# Patient Record
Sex: FEMALE | Race: BLACK OR AFRICAN AMERICAN | Employment: OTHER | URBAN - METROPOLITAN AREA
[De-identification: names, ages, dates, MRNs, and addresses within clinical notes are randomized per-mention and may not be internally consistent; named-entity substitution may affect disease eponyms.]

---

## 2019-06-26 ENCOUNTER — HOSPITAL ENCOUNTER (OUTPATIENT)
Age: 49
Setting detail: SPECIMEN
Discharge: HOME | End: 2019-06-26
Payer: COMMERCIAL

## 2019-06-26 DIAGNOSIS — Z13.220 NEED FOR LIPID SCREENING: ICD-10-CM

## 2019-06-26 DIAGNOSIS — D50.8 OTHER IRON DEFICIENCY ANEMIA: ICD-10-CM

## 2019-06-26 DIAGNOSIS — Z13.1 SCREENING FOR DIABETES MELLITUS: ICD-10-CM

## 2019-06-26 DIAGNOSIS — Z11.4 ENCOUNTER FOR SCREENING FOR HIV: ICD-10-CM

## 2019-06-26 LAB
ABSOLUTE EOS #: 0.26 K/UL (ref 0–0.44)
ABSOLUTE IMMATURE GRANULOCYTE: 0.03 K/UL (ref 0–0.3)
ABSOLUTE LYMPH #: 2.91 K/UL (ref 1.1–3.7)
ABSOLUTE MONO #: 0.48 K/UL (ref 0.1–1.2)
ALBUMIN SERPL-MCNC: 4.1 G/DL (ref 3.5–5.2)
ALBUMIN/GLOBULIN RATIO: 1.2 (ref 1–2.5)
ALP BLD-CCNC: 66 U/L (ref 35–104)
ALT SERPL-CCNC: 13 U/L (ref 5–33)
ANION GAP SERPL CALCULATED.3IONS-SCNC: 10 MMOL/L (ref 9–17)
AST SERPL-CCNC: 14 U/L
BASOPHILS # BLD: 1 % (ref 0–2)
BASOPHILS ABSOLUTE: 0.07 K/UL (ref 0–0.2)
BILIRUB SERPL-MCNC: 0.25 MG/DL (ref 0.3–1.2)
BUN BLDV-MCNC: 14 MG/DL (ref 6–20)
BUN/CREAT BLD: ABNORMAL (ref 9–20)
CALCIUM SERPL-MCNC: 9 MG/DL (ref 8.6–10.4)
CHLORIDE BLD-SCNC: 106 MMOL/L (ref 98–107)
CHOLESTEROL, FASTING: 241 MG/DL
CHOLESTEROL/HDL RATIO: 4.5
CO2: 23 MMOL/L (ref 20–31)
CREAT SERPL-MCNC: 0.89 MG/DL (ref 0.5–0.9)
DIFFERENTIAL TYPE: ABNORMAL
EOSINOPHILS RELATIVE PERCENT: 3 % (ref 1–4)
GFR AFRICAN AMERICAN: >60 ML/MIN
GFR NON-AFRICAN AMERICAN: >60 ML/MIN
GFR SERPL CREATININE-BSD FRML MDRD: ABNORMAL ML/MIN/{1.73_M2}
GFR SERPL CREATININE-BSD FRML MDRD: ABNORMAL ML/MIN/{1.73_M2}
GLUCOSE FASTING: 83 MG/DL (ref 70–99)
HCT VFR BLD CALC: 40.9 % (ref 36.3–47.1)
HDLC SERPL-MCNC: 53 MG/DL
HEMOGLOBIN: 12.1 G/DL (ref 11.9–15.1)
HIV AG/AB: NONREACTIVE
IMMATURE GRANULOCYTES: 0 %
LDL CHOLESTEROL: 177 MG/DL (ref 0–130)
LYMPHOCYTES # BLD: 33 % (ref 24–43)
MCH RBC QN AUTO: 21.3 PG (ref 25.2–33.5)
MCHC RBC AUTO-ENTMCNC: 29.6 G/DL (ref 28.4–34.8)
MCV RBC AUTO: 72 FL (ref 82.6–102.9)
MONOCYTES # BLD: 5 % (ref 3–12)
NRBC AUTOMATED: 0 PER 100 WBC
PDW BLD-RTO: 19.9 % (ref 11.8–14.4)
PLATELET # BLD: 450 K/UL (ref 138–453)
PLATELET ESTIMATE: ABNORMAL
PMV BLD AUTO: 10.2 FL (ref 8.1–13.5)
POTASSIUM SERPL-SCNC: 4.7 MMOL/L (ref 3.7–5.3)
RBC # BLD: 5.68 M/UL (ref 3.95–5.11)
RBC # BLD: ABNORMAL 10*6/UL
SEG NEUTROPHILS: 58 % (ref 36–65)
SEGMENTED NEUTROPHILS ABSOLUTE COUNT: 5.21 K/UL (ref 1.5–8.1)
SODIUM BLD-SCNC: 139 MMOL/L (ref 135–144)
TOTAL PROTEIN: 7.6 G/DL (ref 6.4–8.3)
TRIGLYCERIDE, FASTING: 56 MG/DL
VLDLC SERPL CALC-MCNC: ABNORMAL MG/DL (ref 1–30)
WBC # BLD: 9 K/UL (ref 3.5–11.3)
WBC # BLD: ABNORMAL 10*3/UL

## 2019-07-10 ENCOUNTER — HOSPITAL ENCOUNTER (OUTPATIENT)
Dept: MAMMOGRAPHY | Facility: CLINIC | Age: 49
Discharge: HOME | End: 2019-07-12
Payer: COMMERCIAL

## 2019-07-10 ENCOUNTER — HOSPITAL ENCOUNTER (OUTPATIENT)
Age: 49
Setting detail: SPECIMEN
Discharge: HOME | End: 2019-07-10
Payer: COMMERCIAL

## 2019-07-10 DIAGNOSIS — R53.83 FATIGUE, UNSPECIFIED TYPE: ICD-10-CM

## 2019-07-10 DIAGNOSIS — Z12.31 SCREENING MAMMOGRAM, ENCOUNTER FOR: ICD-10-CM

## 2019-07-10 DIAGNOSIS — R71.8 DECREASED MEAN CORPUSCULAR VOLUME: ICD-10-CM

## 2019-07-10 LAB
FOLATE: 10.9 NG/ML
IRON SATURATION: 10 % (ref 20–55)
IRON: 30 UG/DL (ref 37–145)
TOTAL IRON BINDING CAPACITY: 299 UG/DL (ref 250–450)
UNSATURATED IRON BINDING CAPACITY: 269 UG/DL (ref 112–347)
VITAMIN B-12: 1507 PG/ML (ref 232–1245)

## 2019-07-10 PROCEDURE — 77067 SCR MAMMO BI INCL CAD: CPT

## 2019-07-12 LAB
HPV SAMPLE: NORMAL
HPV, GENOTYPE 16: NOT DETECTED
HPV, GENOTYPE 18: NOT DETECTED
HPV, HIGH RISK OTHER: NOT DETECTED
HPV, INTERPRETATION: NORMAL
SPECIMEN DESCRIPTION: NORMAL

## 2019-07-15 ENCOUNTER — HOSPITAL ENCOUNTER (OUTPATIENT)
Dept: ULTRASOUND IMAGING | Age: 49
End: 2019-07-15
Payer: COMMERCIAL

## 2019-07-15 ENCOUNTER — HOSPITAL ENCOUNTER (OUTPATIENT)
Dept: MAMMOGRAPHY | Age: 49
Discharge: HOME | End: 2019-07-17
Payer: COMMERCIAL

## 2019-07-15 DIAGNOSIS — R92.8 ABNORMAL MAMMOGRAM OF BOTH BREASTS: ICD-10-CM

## 2019-07-15 LAB — CYTOLOGY REPORT: NORMAL

## 2019-07-15 PROCEDURE — G0279 TOMOSYNTHESIS, MAMMO: HCPCS

## 2019-10-28 ENCOUNTER — HOSPITAL ENCOUNTER (OUTPATIENT)
Dept: GENERAL RADIOLOGY | Facility: CLINIC | Age: 49
Discharge: HOME | End: 2019-10-30
Payer: COMMERCIAL

## 2019-10-28 ENCOUNTER — HOSPITAL ENCOUNTER (OUTPATIENT)
Age: 49
Setting detail: SPECIMEN
Discharge: HOME | End: 2019-10-28
Payer: COMMERCIAL

## 2019-10-28 DIAGNOSIS — M79.10 MYALGIA: ICD-10-CM

## 2019-10-28 DIAGNOSIS — R06.02 SOB (SHORTNESS OF BREATH): ICD-10-CM

## 2019-10-28 DIAGNOSIS — R63.4 WEIGHT LOSS: ICD-10-CM

## 2019-10-28 LAB
ALBUMIN SERPL-MCNC: 3.7 G/DL (ref 3.5–5.2)
ALBUMIN/GLOBULIN RATIO: 1.1 (ref 1–2.5)
ALP BLD-CCNC: 64 U/L (ref 35–104)
ALT SERPL-CCNC: 13 U/L (ref 5–33)
ANION GAP SERPL CALCULATED.3IONS-SCNC: 13 MMOL/L (ref 9–17)
AST SERPL-CCNC: 17 U/L
BILIRUB SERPL-MCNC: 0.26 MG/DL (ref 0.3–1.2)
BUN BLDV-MCNC: 11 MG/DL (ref 6–20)
BUN/CREAT BLD: ABNORMAL (ref 9–20)
CALCIUM SERPL-MCNC: 9.1 MG/DL (ref 8.6–10.4)
CHLORIDE BLD-SCNC: 108 MMOL/L (ref 98–107)
CO2: 26 MMOL/L (ref 20–31)
CREAT SERPL-MCNC: 0.61 MG/DL (ref 0.5–0.9)
GFR AFRICAN AMERICAN: >60 ML/MIN
GFR NON-AFRICAN AMERICAN: >60 ML/MIN
GFR SERPL CREATININE-BSD FRML MDRD: ABNORMAL ML/MIN/{1.73_M2}
GFR SERPL CREATININE-BSD FRML MDRD: ABNORMAL ML/MIN/{1.73_M2}
GLUCOSE FASTING: 93 MG/DL (ref 70–99)
HCT VFR BLD CALC: 39.9 % (ref 36.3–47.1)
HEMOGLOBIN: 11.9 G/DL (ref 11.9–15.1)
IRON SATURATION: 10 % (ref 20–55)
IRON: 28 UG/DL (ref 37–145)
MCH RBC QN AUTO: 21.6 PG (ref 25.2–33.5)
MCHC RBC AUTO-ENTMCNC: 29.8 G/DL (ref 28.4–34.8)
MCV RBC AUTO: 72.4 FL (ref 82.6–102.9)
NRBC AUTOMATED: 0 PER 100 WBC
PDW BLD-RTO: 19.4 % (ref 11.8–14.4)
PLATELET # BLD: 410 K/UL (ref 138–453)
PMV BLD AUTO: 9.8 FL (ref 8.1–13.5)
POTASSIUM SERPL-SCNC: 4 MMOL/L (ref 3.7–5.3)
RBC # BLD: 5.51 M/UL (ref 3.95–5.11)
SODIUM BLD-SCNC: 147 MMOL/L (ref 135–144)
TOTAL IRON BINDING CAPACITY: 267 UG/DL (ref 250–450)
TOTAL PROTEIN: 7.1 G/DL (ref 6.4–8.3)
TSH SERPL DL<=0.05 MIU/L-ACNC: 3.9 MIU/L (ref 0.3–5)
UNSATURATED IRON BINDING CAPACITY: 239 UG/DL (ref 112–347)
VITAMIN D 25-HYDROXY: 22 NG/ML (ref 30–100)
WBC # BLD: 6.4 K/UL (ref 3.5–11.3)

## 2019-10-28 PROCEDURE — 71046 X-RAY EXAM CHEST 2 VIEWS: CPT

## 2020-01-01 ENCOUNTER — HOSPITAL ENCOUNTER (OUTPATIENT)
Age: 50
Setting detail: SPECIMEN
Discharge: HOME | End: 2020-10-26
Payer: COMMERCIAL

## 2020-01-01 LAB
ABSOLUTE EOS #: 0.17 K/UL (ref 0–0.44)
ABSOLUTE IMMATURE GRANULOCYTE: <0.03 K/UL (ref 0–0.3)
ABSOLUTE LYMPH #: 2.55 K/UL (ref 1.1–3.7)
ABSOLUTE MONO #: 0.4 K/UL (ref 0.1–1.2)
ALBUMIN SERPL-MCNC: 4 G/DL (ref 3.5–5.2)
ALBUMIN/GLOBULIN RATIO: 1.3 (ref 1–2.5)
ALP BLD-CCNC: 56 U/L (ref 35–104)
ALT SERPL-CCNC: 8 U/L (ref 5–33)
ANION GAP SERPL CALCULATED.3IONS-SCNC: 13 MMOL/L (ref 9–17)
AST SERPL-CCNC: 14 U/L
BASOPHILS # BLD: 1 % (ref 0–2)
BASOPHILS ABSOLUTE: 0.06 K/UL (ref 0–0.2)
BILIRUB SERPL-MCNC: 0.23 MG/DL (ref 0.3–1.2)
BUN BLDV-MCNC: 12 MG/DL (ref 6–20)
BUN/CREAT BLD: ABNORMAL (ref 9–20)
CALCIUM SERPL-MCNC: 9.4 MG/DL (ref 8.6–10.4)
CHLORIDE BLD-SCNC: 105 MMOL/L (ref 98–107)
CHOLESTEROL, FASTING: 236 MG/DL
CHOLESTEROL/HDL RATIO: 5.2
CO2: 26 MMOL/L (ref 20–31)
CREAT SERPL-MCNC: 0.55 MG/DL (ref 0.5–0.9)
DIFFERENTIAL TYPE: ABNORMAL
EOSINOPHILS RELATIVE PERCENT: 3 % (ref 1–4)
FERRITIN: 39 UG/L (ref 13–150)
GFR AFRICAN AMERICAN: >60 ML/MIN
GFR NON-AFRICAN AMERICAN: >60 ML/MIN
GFR SERPL CREATININE-BSD FRML MDRD: ABNORMAL ML/MIN/{1.73_M2}
GFR SERPL CREATININE-BSD FRML MDRD: ABNORMAL ML/MIN/{1.73_M2}
GLUCOSE BLD-MCNC: 98 MG/DL (ref 70–99)
HCT VFR BLD CALC: 41.5 % (ref 36.3–47.1)
HDLC SERPL-MCNC: 45 MG/DL
HEMOGLOBIN: 12.3 G/DL (ref 11.9–15.1)
IMMATURE GRANULOCYTES: 0 %
IRON SATURATION: 13 % (ref 20–55)
IRON: 36 UG/DL (ref 37–145)
LDL CHOLESTEROL: 177 MG/DL (ref 0–130)
LYMPHOCYTES # BLD: 46 % (ref 24–43)
MCH RBC QN AUTO: 22.9 PG (ref 25.2–33.5)
MCHC RBC AUTO-ENTMCNC: 29.6 G/DL (ref 28.4–34.8)
MCV RBC AUTO: 77.4 FL (ref 82.6–102.9)
MONOCYTES # BLD: 7 % (ref 3–12)
NRBC AUTOMATED: 0 PER 100 WBC
PDW BLD-RTO: 17.6 % (ref 11.8–14.4)
PLATELET # BLD: 311 K/UL (ref 138–453)
PLATELET ESTIMATE: ABNORMAL
PMV BLD AUTO: 10.9 FL (ref 8.1–13.5)
POTASSIUM SERPL-SCNC: 4.1 MMOL/L (ref 3.7–5.3)
RBC # BLD: 5.36 M/UL (ref 3.95–5.11)
RBC # BLD: ABNORMAL 10*6/UL
SEG NEUTROPHILS: 43 % (ref 36–65)
SEGMENTED NEUTROPHILS ABSOLUTE COUNT: 2.4 K/UL (ref 1.5–8.1)
SODIUM BLD-SCNC: 144 MMOL/L (ref 135–144)
TOTAL IRON BINDING CAPACITY: 280 UG/DL (ref 250–450)
TOTAL PROTEIN: 7 G/DL (ref 6.4–8.3)
TRIGLYCERIDE, FASTING: 71 MG/DL
UNSATURATED IRON BINDING CAPACITY: 244 UG/DL (ref 112–347)
VITAMIN D 25-HYDROXY: 22.7 NG/ML (ref 30–100)
VLDLC SERPL CALC-MCNC: ABNORMAL MG/DL (ref 1–30)
WBC # BLD: 5.6 K/UL (ref 3.5–11.3)
WBC # BLD: ABNORMAL 10*3/UL

## 2020-01-03 ENCOUNTER — HOSPITAL ENCOUNTER (OUTPATIENT)
Dept: GENERAL RADIOLOGY | Facility: CLINIC | Age: 50
Discharge: HOME | End: 2020-01-05
Payer: COMMERCIAL

## 2020-01-03 ENCOUNTER — HOSPITAL ENCOUNTER (OUTPATIENT)
Age: 50
Setting detail: SPECIMEN
Discharge: HOME | End: 2020-01-03
Payer: COMMERCIAL

## 2020-01-03 LAB
IRON SATURATION: 11 % (ref 20–55)
IRON: 32 UG/DL (ref 37–145)
TOTAL IRON BINDING CAPACITY: 291 UG/DL (ref 250–450)
UNSATURATED IRON BINDING CAPACITY: 259 UG/DL (ref 112–347)
VITAMIN D 25-HYDROXY: 23.7 NG/ML (ref 30–100)

## 2020-01-03 PROCEDURE — 71046 X-RAY EXAM CHEST 2 VIEWS: CPT

## 2020-06-29 ENCOUNTER — HOSPITAL ENCOUNTER (OUTPATIENT)
Dept: OCCUPATIONAL THERAPY | Age: 50
Setting detail: THERAPIES SERIES
Discharge: HOME | End: 2020-06-29
Payer: COMMERCIAL

## 2020-06-29 PROCEDURE — 97110 THERAPEUTIC EXERCISES: CPT

## 2020-06-29 PROCEDURE — 97165 OT EVAL LOW COMPLEX 30 MIN: CPT

## 2020-06-29 NOTE — CONSULTS
currently assists the patient with task   Bathing  [x] Independent  [] Assist [x] Independent  [] Assist    Dress/grooming [x] Independent  [] Assist [x] Independent  [] Assist    Transfer/mobility [x] Independent  [] Assist [x] Independent  [] Assist    Feeding [x] Independent  [] Assist [x] Independent  [] Assist    Toileting [x] Independent  [] Assist [x] Independent  [] Assist    Driving [x] Independent  [] Assist [x] Independent  [] Assist    Housekeeping [x] Independent  [] Assist [x] Independent  [] Assist    Grocery shop/meal prep [x] Independent  [] Assist [x] Independent  [] Assist      Gait Prior level of function Current level of function    [x] Independent  [] Assist [x] Independent  [] Assist   Device: [x] Independent [x] Independent    [] Straight Cane [] Quad cane [] Straight Cane [] Quad cane    [] Standard walker [] Rolling walker   [] 4 wheeled walker [] Standard walker [] Rolling walker   [] 4 wheeled walker    [] Wheelchair [] Wheelchair       Work Status: Normal  Orthosis:    Currently has bilateral wrist cock up splints, only wears L while at work. Pt reports wearing approximately 8 hours/day. Subjective:   Chief Complaint: tingling all the time   Pain: Intensity:   3/10 Location: L wrist     Pain Type: constant and with sedentary activity    Pain Altered Tx: no  Action Taken:none      Objective:  Tests/Measurements: Upper Extremity Functional Index  Current Functional Level:  44 functionally impaired as measured with the Upper Extremity Functional Index Survey. 0-80 scale, with 80 = no Deficits  (The UEFI model does not provide any specific cut off points that could classify the upper limb disability degree, however, a minimal detectable change of 9 points is provided.   This means that for improvement or deterioration to be considered, between two subsequent evaluations, the scores must differ by at least 9 points.)    Sensibility: Tingling and Constant  Edema: None    However, pt reports L digits will become swollen after working    Color: Normal    Skin: Intact    STRENGTH      RIGHT LEFT    35 14   Lateral pinch 3 2   2 point pinch 3 3   3 jaw pinch 3 3     The affected extremity is 60% weaker than the unaffected extremity. (affected score/unaffected score, take the total and subtract from 100)      COORDINATION  - Fine Motor      Right in seconds Percentile Left in seconds Percentile   9 Hole Peg Test 26.32  42.06        DIGITS - L hand         Extension/Flexion   INDEX MIDDLE   MCP 0/WNL 0/WNL   PIP -50/WNL -40/WNL   DIP 0/WNL 0/WNL          DIGITS - R hand         Extension/Flexion   MIDDLE   MCP WNL   PIP -20/WNL   DIP WNL                 Problems: Pain, ROM, Strength, Function, Coordination and Sensation     Assessment:  Patient would benefit from skilled occupational therapy services in order to: increase strength, ROM, coordination, function, and decrease tingling sensations in fingertips to allow for participation in work and self-care activities at prior level of function. Short Term Goals: (  5    Treatments)  1. Decrease Pain: to 2/10 with work activities   2. Increase AROM (degrees)  a. PIP extension to -10 for increased functional use of all digits   3. Increase strength   a. L  strength to 20 pounds for improved ADL participation   b. Bilateral lateral pinch to 7 pounds for improved ability to hold onto small objects  c. Bilateral 2 point pinch to 7 pounds for improved ability to tie shoes    d. Bilateral 3 point pinch to 7 pounds for improved ability to complete buttons on clothing   4. Increase function:UE Functional Index Score to 60 to promote increased function  5. Patient to be independent with home exercise program as demonstrated by performance with correct form without cues. Long Term Goals: (  9  Treatments)  1. Increase L  strength to 25 pounds or more for safe participation in work tasks   2.  Tolerate iontophoresis treatment, given doctor approval  3. Improve active PIP extension of all digits to 0 degrees for increased functional use of bilateral hands  4. Decrease 9 hole peg test time to 30 seconds or less for improve fine motor skills     Patient Goals: button pants and tie shoes     Treatment Potential: Good Suggested Professional Referral: No  Domestic Concerns: No   Barriers to Goal Achievement: No    Comments/Assessment: Pt presents this date s/p diagnosis of bilateral wrist sprain and bilateral carpal tunnel syndrome. Pt is a  for [email or web address] and is required to type for multiple hours a day. Pt reports experiencing pain and tingling in bilateral wrists and hands for extended period of time, but has recently gotten worse. Pt reports L hand and wrist are more severe than R side. Pt is R hand dominant. Pt reports difficulty with buttons, tying shoes, and tying any objects. Pt reports 0/10 pain in R wrist at current time. Pt reports pain will be approximately 6/10 in L wrist by end of work week. Pt reports pain as dull \"most of the time\" but can become sharp and severe at times. Pt reports tingling in fingertips of all digits of L hand and thumb. Pt demo impaired active extension of PIP joints of index and middle fingers on L hand and middle finger on R hand. Pt reports impaired ROM is new since symptoms have worsened. Pt demo impaired strength, function, coordination, ROM, and function of bilateral wrists and hands. Pt with difficulty holding onto pinch meter due to weakness and impaired ROM of index and middle fingers of L hand. Pt issued median nerve glides and AROM exercises this date to complete for HEP, good understanding demonstrated. Pt would benefit from Iontophoresis treatment for decrease of local tissue inflammation and damage.   Waveform: DC, Dosage: 40-80 mA min, Intensity: Up to 4-5 mA, Duration: 10-20 mins depending on dosage and intensity  Allergies discussed with pt and pt identifier confirmed by statement of full

## 2020-07-01 ENCOUNTER — HOSPITAL ENCOUNTER (OUTPATIENT)
Dept: OCCUPATIONAL THERAPY | Age: 50
Setting detail: THERAPIES SERIES
Discharge: HOME | End: 2020-07-01
Payer: COMMERCIAL

## 2020-07-01 PROCEDURE — 97530 THERAPEUTIC ACTIVITIES: CPT

## 2020-07-01 PROCEDURE — 97035 APP MDLTY 1+ULTRASOUND EA 15: CPT

## 2020-07-01 NOTE — FLOWSHEET NOTE
extension  Palmar pinch  Lateral pinch     yellow 10 Issued this date   Plan to complete 3 jaw golden and finger extension on next visit. Other: muscle loss noted in B thenar eminence with tight flexor tendons to the L index and long digit. Not noted nodules at this time. Initiated theraputty this date with good response, plan to add in additional putty exercises on next visit. Specific Instructions for next treatment:       Treatment Charges: Mins Units Time In/Out   [x]  Modalities US  8  1 0300-8922   [x]  Ther Exercise 24 1 6805-9312   []  Manual Therapy      []  Ther Activities      []        []        []        Total Treatment time 32 min           Assessment: Progressing Towards Goals    Short Term Goals: (  5    Treatments)  1. Decrease Pain: to 2/10 with work activities   2. Increase AROM (degrees)  a. PIP extension to -10 for increased functional use of all digits   3. Increase strength   a. L  strength to 20 pounds for improved ADL participation   b. Bilateral lateral pinch to 7 pounds for improved ability to hold onto small objects  c. Bilateral 2 point pinch to 7 pounds for improved ability to tie shoes    d. Bilateral 3 point pinch to 7 pounds for improved ability to complete buttons on clothing   4. Increase function:UE Functional Index Score to 60 to promote increased function  5. Patient to be independent with home exercise program as demonstrated by performance with correct form without cues.     Long Term Goals: (  9  Treatments)  1. Increase L  strength to 25 pounds or more for safe participation in work tasks   2. Tolerate iontophoresis treatment, given doctor approval  3. Improve active PIP extension of all digits to 0 degrees for increased functional use of bilateral hands  4. Decrease 9 hole peg test time to 30 seconds or less for improve fine motor skills          Pt.  Education:  Reviewed Prior HEP/Ed  Method of Education: Verbal, Written and Demo  Comprehension of Education: Needs Review      Plan:  Continue with current plan           Time In/Out: 0120-5737       Electronically signed by:  Noemi Posey OTR/L

## 2020-07-02 ENCOUNTER — HOSPITAL ENCOUNTER (OUTPATIENT)
Dept: OCCUPATIONAL THERAPY | Age: 50
Setting detail: THERAPIES SERIES
Discharge: HOME | End: 2020-07-02
Payer: COMMERCIAL

## 2020-07-02 PROCEDURE — 97035 APP MDLTY 1+ULTRASOUND EA 15: CPT

## 2020-07-02 PROCEDURE — 97140 MANUAL THERAPY 1/> REGIONS: CPT

## 2020-07-02 PROCEDURE — 97110 THERAPEUTIC EXERCISES: CPT

## 2020-07-02 NOTE — FLOWSHEET NOTE
completed    Ultrasound - see parameters above     completed   Soft tissue massage      completed     Theraputty   strength  Finger flexion  Finger extension  Palmar pinch  Lateral pinch     10 reps Yellow Completed  Home ex program reviewed. Three jaw golden ex not added to program, as pt unable to position hands for finger extension ex. Other: Signed order received for iontophoresis from Dr. Joel Matias, awaiting -9 authorization. Original C-9 auth for treatment for bilateral wrist sprains only. Significant muscle atrophy observed in left hand thenar eminence and lumbrical/interossious muscles, with mild atrophy and decreased muscle tone in hypothenar eminence musculature. Left index and middle fingers posture in \"claw\" position (full passive extension present). Finger extension of these fingers is  innervated by the C7 root. Left thumb also in claw position with thumb postured in same plane as hand. Functional /grasp of the left hand, specifically lack of active thumb palmar abduction and full finger extension, is compromised by the muscle imbalance noted above. The right hand, by pt report is now starting to exhibit similar symptoms. Mild right thenar muscle atrophy and clawing of index and middle fingers observed. Hypothenar muscles have decreased muscle tone as evidenced by palpation. Pt reports pain (3/10) with active right wrist flexion. Putty exercises initiated, with additional putty ex planned for subsequent visits. Specific Instructions for next treatment:    Treatment Charges: Mins Units Time In/Out   [x]  Modalities: Ultrasound   8 8 1236 - 5725   [x]  Ther Exercise 34 2 6546 - 7721    [x]  Manual Therapy 18 1 2447 - 0542   []  Ther Activities      []        []        []        Total Treatment time 60 min           Assessment: Progressing Towards Goals    Short Term Goals: (  5    Treatments)  1. Decrease Pain: to 2/10 with work activities   2. Increase AROM (degrees)  a.  PIP extension to -10 for increased functional use of all digits   3. Increase strength   a. L  strength to 20 pounds for improved ADL participation   b. Bilateral lateral pinch to 7 pounds for improved ability to hold onto small objects  c. Bilateral 2 point pinch to 7 pounds for improved ability to tie shoes    d. Bilateral 3 point pinch to 7 pounds for improved ability to complete buttons on clothing   4. Increase function:UE Functional Index Score to 60 to promote increased function  5. Patient to be independent with home exercise program as demonstrated by performance with correct form without cues.     Long Term Goals: (  9  Treatments)  1. Increase L  strength to 25 pounds or more for safe participation in work tasks   2. Tolerate iontophoresis treatment, given doctor approval  3. Improve active PIP extension of all digits to 0 degrees for increased functional use of bilateral hands  4. Decrease 9 hole peg test time to 30 seconds or less for improve fine motor skills        Pt.  Education:  Reviewed Prior HEP/Ed  Method of Education: Verbal, Written and Demo  Comprehension of Education: Needs Review      Plan:  Continue with current plan     Time In/Out: 4582 - 1961         Electronically signed by:  Roosevelt Humphreys OTR/HANNAH, CHT

## 2020-07-06 ENCOUNTER — HOSPITAL ENCOUNTER (OUTPATIENT)
Dept: OCCUPATIONAL THERAPY | Age: 50
Setting detail: THERAPIES SERIES
Discharge: HOME | End: 2020-07-06
Payer: COMMERCIAL

## 2020-07-06 PROCEDURE — 97140 MANUAL THERAPY 1/> REGIONS: CPT

## 2020-07-06 PROCEDURE — 97110 THERAPEUTIC EXERCISES: CPT

## 2020-07-06 PROCEDURE — 97035 APP MDLTY 1+ULTRASOUND EA 15: CPT

## 2020-07-06 NOTE — FLOWSHEET NOTE
[x] [address] [facility] floor       [address], [place]         Phone: [phone]       Fax: [phone] [] [address] [facility] [address] [facility] , [address] [facility]  Phone: [phone]  Fax: [phone]     Occupational Therapy Daily Treatment Note    Date:  2020  Patient Name:  Cl Beatty    :  1970  MRN: [id number]  Patient: Cl Beatty                        : 1970                        MRN: [id number]                         Physician: Day Rodriguez MD  Insurance: [facility] Diagnosis: S63.502 unspecified sprain of L wrist. S63.501 unspecified sprain of R wrist, G56.01 carpal tunnel syndrome R, G56.02 carpal tunnel syndrome L     Rehab Codes: pain in hand M79.646,, pain in wrist M25.53,, stiffness in hand M25.64,, stiffness in wrist M25.63,, lack of coordination R27.8,, fine motor skills loss R29.818,, muscle weakness generalized M62.81,, pain in right forearm M79.631,, pain in left forearm M79.632,, pain in right hand M79.641,, pain in left hand M79.642,, pain in right finger(s) M79.644, or pain in left finger(s) M79.645,  Onset Date: 2020                          Next Dr. Royal Vargas: 7-  Visit# / total visits: 4/ 9    Cancels/No Shows: 0/0      Subjective:    Pain:  No Location: Pain with wrist flexion   Pain Rating: (0-10 scale) 3/10  Pain altered Tx:  No  Action: NA  Comments: NA    Objective:  Modalities: Modality Flow Sheet:  START STOP Tx Modality     Electrical Stim:     20  Ultrasound: __0.8_ W/cm2 x __8_ mins total  Duty factor: _x_100%  __50%  __33% __20%  Head size: 2 cm  MHz: __1mHz  __x [facility]  Location: Bilateral volar wrists , four minutes each. Hot Pack:     Cold Pack:     Exercises:  Flow Sheet:  Exercise Reps/Time Weight/Level Comments   AROM - bilateral  10 reps   completed   Home ex program reviewed. Pt indep with ex.  Goal MET   Median nerve glides - bilateral   10 reps   completed    Ultrasound - see parameters above     completed   Soft tissue massage      completed     Theraputty   strength  Finger flexion  Finger extension  Palmar pinch  Lateral pinch  Thumb extension  3 jaw golden     10 reps each side Yellow Completed  Home ex program reviewed. Thumb/finger extension attempted but unable to be completed          Other: Signed order received for iontophoresis from Dr. Derick Ibrahim, awaiting C-9 authorization. Original C-9 auth for treatment for bilateral wrist sprains only. Significant muscle atrophy observed in left hand thenar eminence and lumbrical/interossious muscles, with mild atrophy and decreased muscle tone in hypothenar eminence musculature. Left index and middle fingers posture in \"claw\" position (full passive extension present). Finger extension of these fingers is  innervated by the C7 root. Left thumb also in claw position with thumb postured in same plane as hand. Functional /grasp of the left hand, specifically lack of active thumb palmar abduction and full finger extension, is compromised by the muscle imbalance noted above. The right hand, by pt report is now starting to exhibit similar symptoms. Mild right thenar muscle atrophy and clawing of index and middle fingers observed. Hypothenar muscles have decreased muscle tone as evidenced by palpation. Pt reports pain (3/10) with active right wrist flexion. Pt reports having an appointment with a hand specialist tomorrow, 7/7/2020, to address clawing of L and R index and middle fingers. Specific Instructions for next treatment:    Treatment Charges: Mins Units Time In/Out   [x]  Modalities: Ultrasound   8 1 6140 - 3968   [x]  Ther Exercise 27 2 0985 - 5779    [x]  Manual Therapy 18 1 9706 - 3603   []  Ther Activities      []        []        []        Total Treatment time 53 min           Assessment: Progressing Towards Goals    Short Term Goals: (  5    Treatments)  1.  Decrease Pain: to 2/10 with work activities   2. Increase AROM (degrees)  a. PIP extension to -10 for increased functional use of all digits   3. Increase strength   a. L  strength to 20 pounds for improved ADL participation   b. Bilateral lateral pinch to 7 pounds for improved ability to hold onto small objects  c. Bilateral 2 point pinch to 7 pounds for improved ability to tie shoes    d. Bilateral 3 point pinch to 7 pounds for improved ability to complete buttons on clothing   4. Increase function:UE Functional Index Score to 60 to promote increased function  5. Patient to be independent with home exercise program as demonstrated by performance with correct form without cues.     Long Term Goals: (  9  Treatments)  1. Increase L  strength to 25 pounds or more for safe participation in work tasks   2. Tolerate iontophoresis treatment, given doctor approval  3. Improve active PIP extension of all digits to 0 degrees for increased functional use of bilateral hands  4. Decrease 9 hole peg test time to 30 seconds or less for improve fine motor skills        Pt.  Education:  Reviewed Prior HEP/Ed  Method of Education: Verbal, Written and Demo  Comprehension of Education: Needs Review      Plan:  Continue with current plan     Time In/Out: 0535 - 8350         Electronically signed by:  CHRISSY Wade/HANNAH

## 2020-07-08 ENCOUNTER — HOSPITAL ENCOUNTER (OUTPATIENT)
Dept: OCCUPATIONAL THERAPY | Age: 50
Setting detail: THERAPIES SERIES
Discharge: HOME | End: 2020-07-08
Payer: COMMERCIAL

## 2020-07-08 PROCEDURE — 97110 THERAPEUTIC EXERCISES: CPT

## 2020-07-08 PROCEDURE — 97035 APP MDLTY 1+ULTRASOUND EA 15: CPT

## 2020-07-08 NOTE — PROGRESS NOTES
[x] [address] [facility] floor       [address], [place]         Phone: [phone]       Fax: [phone] [] [address] [facility] at [address] , [address] [place] [address]  Phone: [phone]  Fax: [phone]       Occupational Therapy Hand & Upper Extremity  Progress Note     Date: 2020      Patient: Amanda Friedman  : 1970  MRN: [id number]    Physician: Bhavin Sheppard MD  Insurance: [facility]  Medical Diagnosis: S63.502 unspecified sprain of L wrist. S63.501 unspecified sprain of R wrist, G56.01 carpal tunnel syndrome R, G56.02 carpal tunnel syndrome L     Rehab Codes: pain in hand M79.646,, pain in wrist M25.53,, stiffness in hand M25.64,, stiffness in wrist M25.63,, lack of coordination R27.8,, fine motor skills loss R29.818,, muscle weakness generalized M62.81,, pain in right forearm M79.631,, pain in left forearm M79.632,, pain in right hand M79.641,, pain in left hand M79.642,, pain in right finger(s) M79.644, or pain in left finger(s) M79.645,  Onset Date: 2020    Next Dr. Bill Grand: 7-  C9 ends 2020  Visit#:     Past Medical History:   Unremarkable      Comorbidities:   [] Obesity [] Dialysis  [] Other:   [] Asthma/COPD [] Dementia [] Other:   [] Stroke [] Sleep apnea [] Other:   [] Vascular disease [] Rheumatic disease [] Other:     Medications:   Refer to Medical chart in Epic    Allergies:    None       Mechanism of Injury: RSI Surgery Date: NA    Precautions:  None            Involved Extremity:        Bilateral  Dominant: Right    Home Environment:  Pt lives in a  and House  2 and No Handrail  The washing machine is on and the lower level (basement)    Employer    Job Status [x]  Normal duty   [] Light duty   [] Off due to condition    []  Retired   [] Not employed   [] Disability  [] Other:  []  Return to work:    Work activities/duties      ADL/IADL Previous level of function Current level of function Who currently assists the patient with task   Bathing  [x] Independent  [] Assist [x] Independent  [] Assist    Dress/grooming [x] Independent  [] Assist [x] Independent  [] Assist    Transfer/mobility [x] Independent  [] Assist [x] Independent  [] Assist    Feeding [x] Independent  [] Assist [x] Independent  [] Assist    Toileting [x] Independent  [] Assist [x] Independent  [] Assist    Driving [x] Independent  [] Assist [x] Independent  [] Assist    Housekeeping [x] Independent  [] Assist [x] Independent  [] Assist    Grocery shop/meal prep [x] Independent  [] Assist [x] Independent  [] Assist      Gait Prior level of function Current level of function    [x] Independent  [] Assist [x] Independent  [] Assist   Device: [x] Independent [x] Independent    [] Straight Cane [] Quad cane [] Straight Cane [] Quad cane    [] Standard walker [] Rolling walker   [] 4 wheeled walker [] Standard walker [] Rolling walker   [] 4 wheeled walker    [] Wheelchair [] Wheelchair       Work Status: Normal  Orthosis:    Currently has bilateral wrist cock up splints, only wears L while at work. Pt reports wearing approximately 8 hours/day. Subjective:   Chief Complaint: tingling all the time   Pain: Intensity:   3/10 Location: L wrist     Pain Type: constant and with sedentary activity    Pain Altered Tx: no  Action Taken:none      Objective:  Tests/Measurements: Upper Extremity Functional Index  Evaluation Functional Level: 44/80  Current Functional Level:  54/80 functionally impaired as measured with the Upper Extremity Functional Index Survey. 0-80 scale, with 80 = no Deficits  (The UEFI model does not provide any specific cut off points that could classify the upper limb disability degree, however, a minimal detectable change of 9 points is provided.   This means that for improvement or deterioration to be considered, between two subsequent evaluations, the scores must differ by at least 9 points.)    Sensibility: Tingling and Constant  Edema: None    However, pt reports L digits will become swollen after working    Color: Normal    Skin: Intact      STRENGTH 7-8-2020      RIGHT LEFT    31 20   Lateral pinch 3 3   2 point pinch 3 3   3 jaw pinch 4 4     Both extremities are affected, however LUE has more progressive symptoms and is 36%  weaker than the RUE. (affected score/unaffected score, take the total and subtract from 100)        COORDINATION  - Fine Motor 7-8-2020     Right in seconds Percentile Left in seconds Percentile   9 Hole Peg Test 20.84  30.75        DIGITS - L hand 7-8-2020         Extension/Flexion   INDEX MIDDLE   MCP 0/WNL 0/WNL   PIP -44/WNL -30/WNL   DIP 0/WNL 0/WNL          DIGITS - R hand 7-8-2020         Extension/Flexion   MIDDLE   MCP WNL   PIP -12/WNL   DIP WNL           STRENGTH      RIGHT LEFT    35 14   Lateral pinch 3 2   2 point pinch 3 3   3 jaw pinch 3 3     The affected extremity is 60% weaker than the unaffected extremity. (affected score/unaffected score, take the total and subtract from 100)        COORDINATION  - Fine Motor      Right in seconds Percentile Left in seconds Percentile   9 Hole Peg Test 26.32  42.06        DIGITS - L hand         Extension/Flexion   INDEX MIDDLE   MCP 0/WNL 0/WNL   PIP -50/WNL -40/WNL   DIP 0/WNL 0/WNL          DIGITS - R hand         Extension/Flexion   MIDDLE   MCP WNL   PIP -20/WNL   DIP WNL                 Problems: Pain, ROM, Strength, Function, Coordination and Sensation     Assessment:  Patient would benefit from skilled occupational therapy services in order to: increase strength, ROM, coordination, function, and decrease tingling sensations in fingertips to allow for participation in work and self-care activities at prior level of function. Short Term Goals: (  5    Treatments)  1. Decrease Pain: to 2/10 with work activities NOT MET  2. Increase AROM (degrees)  a.  PIP extension to -10 for increased functional use of all digits NOT MET, improved 3. Increase strength   a. L  strength to 20 pounds for improved ADL participation MET  b. Bilateral lateral pinch to 7 pounds for improved ability to hold onto small objects NOT MET  c. Bilateral 2 point pinch to 7 pounds for improved ability to tie shoes  NOT MET  d. Bilateral 3 point pinch to 7 pounds for improved ability to complete buttons on clothing NOT MET  4. Increase function:UE Functional Index Score to 60 to promote increased function NOT MET, improved   5. Patient to be independent with home exercise program as demonstrated by performance with correct form without cues. MET    Long Term Goals: (  9  Treatments)  1. Increase L  strength to 25 pounds or more for safe participation in work tasks NOT MET  2. Tolerate iontophoresis treatment, given doctor approval - have not begun  3. Improve active PIP extension of all digits to 0 degrees for increased functional use of bilateral hands NOT MET  4. Decrease 9 hole peg test time to 30 seconds or less for improve fine motor skills NOT MET, (currently 30.75 seconds)    Patient Goals: button pants and tie shoes, NOT MET     Treatment Potential: Good Suggested Professional Referral: No  Domestic Concerns: No   Barriers to Goal Achievement: No    Comments/Assessment: Pt presents this date s/p diagnosis of bilateral wrist sprain and bilateral carpal tunnel syndrome. Pt is a  for [employer] and is required to type for multiple hours a day. Pt reports experiencing pain and tingling in bilateral wrists and hands for extended period of time, but has recently gotten worse. Pt reports L hand and wrist are more severe than R side. Pt is R hand dominant. Pt reports difficulty with buttons, tying shoes, and tying any objects. Pt reports pain as dull \"most of the time\" but can become sharp and severe at times. Pt reports tingling in fingertips of all digits of L hand and thumb.  Pt demo impaired active extension of PIP joints of index and middle fingers on L hand and middle finger on R hand. Iontophoresis treatment has not been completed because inflammation does not appear to be a problem with pt's current condition. Pt demo claw position with L index and middle fingers and thumb, which is not a result of inflammation. Pt demo improved strength, function, coordination, ROM, and function of bilateral wrists and hands AEB measurements taken this date for progress note. L  strength increased 8 pounds, R  strength decreased 4 pounds, and all pinch strength measurements remained the same since evaluation. Pt with difficulty holding onto pinch meter due to weakness and impaired ROM of index and middle fingers of L hand. Fine motor skills have improved AEB a decrease of 12 seconds on 9 hole peg test with L hand and a decrease of 6 seconds with R hand. L index finger PIP extension improved by 8 degrees and is now at -44 degrees. L middle finger PIP extension improved by 10 degrees and is now at -30 degrees. R middle finger PIP extension improved by 8 degrees and is now at -12 degrees. Pt demo significant muscle atrophy observed in left hand thenar eminence and lumbrical/interossious muscles, with mild atrophy and decreased muscle tone in hypothenar eminence musculature. Left index and middle fingers posture in \"claw\" position (full passive extension present). Finger extension of these fingers is  innervated by the C7 root. Left thumb also in claw position with thumb postured in same plane as hand. Functional /grasp of the left hand, specifically lack of active thumb palmar abduction and full finger extension, is compromised by the muscle imbalance noted above. The right hand, by pt report is now starting to exhibit similar symptoms. Mild right thenar muscle atrophy and clawing of index and middle fingers observed. Hypothenar muscles have decreased muscle tone as evidenced by palpation.  Pt reports seeing a hand specialist yesterday and is to have x-rays

## 2020-07-10 ENCOUNTER — HOSPITAL ENCOUNTER (OUTPATIENT)
Dept: OCCUPATIONAL THERAPY | Age: 50
Setting detail: THERAPIES SERIES
Discharge: HOME | End: 2020-07-10
Payer: COMMERCIAL

## 2020-07-10 PROCEDURE — 97110 THERAPEUTIC EXERCISES: CPT

## 2020-07-10 PROCEDURE — 97035 APP MDLTY 1+ULTRASOUND EA 15: CPT

## 2020-07-10 NOTE — FLOWSHEET NOTE
[x] [address] [facility] floor       [address], [place]         Phone: [phone]       Fax: [phone] [] [address]135 [facility] at [address] [facility] , [address] [place] [address]  Phone: [phone]  Fax: [phone]     Occupational Therapy Daily Treatment Note    Date:  7/10/2020  Patient Name:  Henrique Punch    :  1970  MRN: [id number]  Patient: [place] Punch                        : 1970                        MRN: [id number]                         Physician: Claribel Camp MD  Insurance: [id number] Diagnosis: S63.502 unspecified sprain of L wrist. S63.501 unspecified sprain of R wrist, G56.01 carpal tunnel syndrome R, G56.02 carpal tunnel syndrome L     Rehab Codes: pain in hand M79.646,, pain in wrist M25.53,, stiffness in hand M25.64,, stiffness in wrist M25.63,, lack of coordination R27.8,, fine motor skills loss R29.818,, muscle weakness generalized M62.81,, pain in right forearm M79.631,, pain in left forearm M79.632,, pain in right hand M79.641,, pain in left hand M79.642,, pain in right finger(s) M79.644, or pain in left finger(s) M79.645,  Onset Date: 2020                          Next Dr. Abimbola Muller: 7-  Visit# / total visits:     Cancels/No Shows: 0/0      Subjective:    Pain:  No Location: Pain with wrist flexion   Pain Rating: (0-10 scale) 310  Pain altered Tx:  No  Action: NA  Comments: NA    Objective:  Modalities: Modality Flow Sheet:  START STOP Tx Modality     Electrical Stim:     20  Ultrasound: __0.8_ W/cm2 x __8_ mins total  Duty factor: _x_100%  __50%  __33% __20%  Head size: 2 cm  MHz: __1mHz  __x [facility]  Location: Bilateral volar wrists , four minutes each. Hot Pack:     Cold Pack:     Exercises:  Flow Sheet:  Exercise Reps/Time Weight/Level Comments   AROM - bilateral  10 reps   Not completed   Home ex program reviewed. Pt indep with ex.  Goal MET   Median nerve glides - bilateral   10 reps    Not completed    Ultrasound - see parameters above     completed   Soft tissue massage      Not completed     Theraputty   strength  Finger flexion  Finger extension  Palmar pinch  Lateral pinch  Thumb extension  3 jaw golden       10 reps each side Yellow Completed  Home ex program reviewed. Thumb/finger extension attempted but unable to be completed      Pinch pins with foam blocks  30 cubes with L, 1 series with R hand  Yellow  Added & completed    Small peg board  4 rows with L hand   Added & completed        Other: Signed order received for iontophoresis from Dr. Letty Beck, awaiting -9 authorization. Original C-9 auth for treatment for bilateral wrist sprains only. Significant muscle atrophy observed in left hand thenar eminence and lumbrical/interossious muscles, with mild atrophy and decreased muscle tone in hypothenar eminence musculature. Left index and middle fingers posture in \"claw\" position (full passive extension present). Finger extension of these fingers is  innervated by the C7 root. Left thumb also in claw position with thumb postured in same plane as hand. Functional /grasp of the left hand, specifically lack of active thumb palmar abduction and full finger extension, is compromised by the muscle imbalance noted above. The right hand, by pt report is now starting to exhibit similar symptoms. Mild right thenar muscle atrophy and clawing of index and middle fingers observed. Hypothenar muscles have decreased muscle tone as evidenced by palpation. Review of putty exercises needed this date, demo improved use of L index and middle fingers with putty compared to past treatment sessions. Small peg board activity added to promote fine motor coordination skills. Mod difficulty completing and manipulating small pegs. Pt often had to use table to assist with position pegs in between fingers to be able to place them correctly. Small peg board was too easy with R hand.          Specific Instructions for next treatment:    Treatment Charges: Mins Units Time In/Out   [x]  Modalities: Ultrasound   8 1 9582- 0756   [x]  Ther Exercise 48 3 1113 -  1201   []  Manual Therapy      []  Ther Activities      []        []        []        Total Treatment time 56 min           Assessment: Progressing Towards Goals    Short Term Goals: (  5    Treatments)  1. Decrease Pain: to 2/10 with work activities   2. Increase AROM (degrees)  a. PIP extension to -10 for increased functional use of all digits   3. Increase strength   a. L  strength to 20 pounds for improved ADL participation   b. Bilateral lateral pinch to 7 pounds for improved ability to hold onto small objects  c. Bilateral 2 point pinch to 7 pounds for improved ability to tie shoes    d. Bilateral 3 point pinch to 7 pounds for improved ability to complete buttons on clothing   4. Increase function:UE Functional Index Score to 60 to promote increased function  5. Patient to be independent with home exercise program as demonstrated by performance with correct form without cues.     Long Term Goals: (  9  Treatments)  1. Increase L  strength to 25 pounds or more for safe participation in work tasks   2. Tolerate iontophoresis treatment, given doctor approval  3. Improve active PIP extension of all digits to 0 degrees for increased functional use of bilateral hands  4. Decrease 9 hole peg test time to 30 seconds or less for improve fine motor skills        Pt.  Education:  Reviewed Prior HEP/Ed  Method of Education: Verbal, Written and Demo  Comprehension of Education: Needs Review      Plan:  Continue with current plan     Time In/Out: 1979 - 1201         Electronically signed by:  CHRISSY Chambers/HANNAH

## 2020-07-14 ENCOUNTER — HOSPITAL ENCOUNTER (OUTPATIENT)
Dept: OCCUPATIONAL THERAPY | Age: 50
Setting detail: THERAPIES SERIES
Discharge: HOME | End: 2020-07-14
Payer: COMMERCIAL

## 2020-07-14 PROCEDURE — 97110 THERAPEUTIC EXERCISES: CPT

## 2020-07-14 PROCEDURE — 97035 APP MDLTY 1+ULTRASOUND EA 15: CPT

## 2020-07-14 NOTE — FLOWSHEET NOTE
Not completed    Ultrasound - see parameters above     completed   Soft tissue massage      Not completed     Theraputty   strength  Finger flexion  Finger extension  Palmar pinch  Lateral pinch  Thumb extension  3 jaw golden       10 reps each side Yellow Completed  Home ex program reviewed. Thumb/finger extension attempted but unable to be completed      Pinch pins with foam blocks  30 cubes with L, 1 series with R hand  red Increased this date   Small peg board  4 rows with L hand   Not  completed        Other: Original C-9 auth for treatment for bilateral wrist sprains only. Significant muscle atrophy observed in left hand thenar eminence and lumbrical/interossious muscles, with mild atrophy and decreased muscle tone in hypothenar eminence musculature. Left index and middle fingers posture in \"claw\" position (full passive extension present). Finger extension of these fingers is  innervated by the C7 root. Left thumb also in claw position with thumb postured in same plane as hand. Functional /grasp of the left hand, specifically lack of active thumb palmar abduction and full finger extension, is compromised by the muscle imbalance noted above. The right hand, by pt report is now starting to exhibit similar symptoms. Mild right thenar muscle atrophy and clawing of index and middle fingers observed. Hypothenar muscles have decreased muscle tone as evidenced by palpation. Increased resistance on pinch pins with good results. Specific Instructions for next treatment:    Treatment Charges: Mins Units Time In/Out   [x]  Modalities: Ultrasound   8 1 5990-8057   [x]  Ther Exercise 30 9 9549-9432   []  Manual Therapy      []  Ther Activities      []        []        []        Total Treatment time 38 min           Assessment: Progressing Towards Goals    Short Term Goals: (  5    Treatments)  1. Decrease Pain: to 2/10 with work activities   2. Increase AROM (degrees)  a.  PIP extension to -10 for increased functional use of all digits   3. Increase strength   a. L  strength to 20 pounds for improved ADL participation   b. Bilateral lateral pinch to 7 pounds for improved ability to hold onto small objects  c. Bilateral 2 point pinch to 7 pounds for improved ability to tie shoes    d. Bilateral 3 point pinch to 7 pounds for improved ability to complete buttons on clothing   4. Increase function:UE Functional Index Score to 60 to promote increased function  5. Patient to be independent with home exercise program as demonstrated by performance with correct form without cues.     Long Term Goals: (  9  Treatments)  1. Increase L  strength to 25 pounds or more for safe participation in work tasks   2. Tolerate iontophoresis treatment, given doctor approval  3. Improve active PIP extension of all digits to 0 degrees for increased functional use of bilateral hands  4. Decrease 9 hole peg test time to 30 seconds or less for improve fine motor skills        Pt.  Education:  Reviewed Prior HEP/Ed  Method of Education: Verbal, Written and Demo  Comprehension of Education: Needs Review      Plan:  Continue with current plan     Time In/Out: 1100 - 1140         Electronically signed by:  CHRISSY Ramos/HANNAH

## 2020-07-16 ENCOUNTER — HOSPITAL ENCOUNTER (OUTPATIENT)
Dept: OCCUPATIONAL THERAPY | Age: 50
Setting detail: THERAPIES SERIES
Discharge: HOME | End: 2020-07-16
Payer: COMMERCIAL

## 2020-07-16 PROCEDURE — 97110 THERAPEUTIC EXERCISES: CPT

## 2020-07-16 NOTE — DISCHARGE SUMMARY
[x] [address] [facility] floor       [address], [place]         Phone: [phone]       Fax: [phone] [] [address] [facility] at [address] [facility] , [address]  Phone: [phone]  Fax: [phone]       Occupational Therapy Hand & Upper Extremity  Discharge Note     Date: 2020      Patient: Ovi Florez  : 1970  MRN: [id number]    Physician: Norman Darby MD  Insurance: [facility]  Medical Diagnosis: S63.502 unspecified sprain of L wrist. S63.501 unspecified sprain of R wrist, G56.01 carpal tunnel syndrome R, G56.02 carpal tunnel syndrome L     Rehab Codes: pain in hand M79.646,, pain in wrist M25.53,, stiffness in hand M25.64,, stiffness in wrist M25.63,, lack of coordination R27.8,, fine motor skills loss R29.818,, muscle weakness generalized M62.81,, pain in right forearm M79.631,, pain in left forearm M79.632,, pain in right hand M79.641,, pain in left hand M79.642,, pain in right finger(s) M79.644, or pain in left finger(s) M79.645,  Onset Date: 2020    Next Dr. Diamante Carrillo:  As needed    Total visits attended:  8  Cancels/No shows:    Date of initial visit: 20                Date of final visit: 20   ends      Mechanism of Injury:  RSI  Surgery Date: NA    Precautions:  None            Involved Extremity:        Bilateral  Dominant: Right    Orthosis:    Currently has bilateral wrist cock up splints, only wears L while at work. Pt reports wearing approximately 8 hours/day. Subjective:   Chief Complaint: tingling all the time   Pain: Intensity:   2/10 Location: L wrist     Pain Type: Constant     Pain Altered Tx: no  Action Taken:none      Objective:  Tests/Measurements: Upper Extremity Functional Index  Current Functional Level:  52/80 functionally impaired as measured with the Upper Extremity Functional Index Survey.   0-80 scale, with 80 = no Deficits   Slight decrease in functional L hand    Current 07/16/20     Extension/Flexion        Degrees INDEX MIDDLE   MCP +25/WNL +25/WNL   PIP -55/WNL -35/WNL   DIP    0/WNL    0/WNL          DIGITS - R hand    Current 06/16/20      Extension/Flexion         Degrees MIDDLE   MCP +15/WNL   PIP -20/WNL   DIP WNL/WNL         DIGITS - L hand    Previous 7-8-2020      Extension/Flexion   INDEX MIDDLE   MCP 0/WNL 0/WNL   PIP -44/WNL -30/WNL   DIP 0/WNL 0/WNL          DIGITS - R hand    Previous 7-8-2020      Extension/Flexion   MIDDLE   MCP WNL   PIP -12/WNL   DIP WNL         DIGITS - L hand   Initial 06/29/20   Extension/Flexion   INDEX MIDDLE   MCP 0/WNL 0/WNL   PIP -50/WNL -40/WNL   DIP 0/WNL 0/WNL          DIGITS - R hand   Initial 06/29/20    Extension/Flexion   MIDDLE   MCP WNL   PIP -20/WNL   DIP WNL         COORDINATION  - Fine Motor    Current 07/16/20   Right in seconds Percentile Left in seconds Percentile   9 Hole Peg Test 21.60 seconds  0.76 seconds slower Greater than the 25th percentile 46.25 seconds  15.5 seconds slower Below the 10th percentile     COORDINATION  - Fine Motor   Previous 7-8-2020   Right in seconds Percentile Left in seconds Percentile   9 Hole Peg Test 20.84  30.75      COORDINATION  - Fine Motor    Initial 06/29/20   Right in seconds Percentile Left in seconds Percentile   9 Hole Peg Test 26.32  42.06          Problems: Pain, ROM, Strength, Function, Coordination and Sensation     Short Term Goals: (  5    Treatments)  1. Decrease Pain: to 2/10 with work activities  MET  2. Increase AROM (degrees)  a. PIP extension to -10 for increased functional use of all digits NOT MET  3.  Increase strength   a. L  strength to 20 pounds for improved ADL participation MET  b. Bilateral lateral pinch to 7 pounds for improved ability to hold onto small objects NOT MET  c. Bilateral 2 point pinch to 7 pounds for improved ability to tie shoes  NOT MET  d. Bilateral 3 point pinch to 7 pounds for improved ability to complete buttons on clothing NOT MET  4. Increase function:UE Functional Index Score to 60 to promote increased function NOT MET  5. Patient to be independent with home exercise program as demonstrated by performance with correct form without cues. MET    Long Term Goals: (  9  Treatments)  1. Increase L  strength to 25 pounds or more for safe participation in work tasks NOT MET  2. Tolerate iontophoresis treatment, given doctor approval - NA  3. Improve active PIP extension of all digits to 0 degrees for increased functional use of bilateral hands NOT MET  4. Decrease 9 hole peg test time to 30 seconds or less for improve fine motor skills NOT MET, (currently 46.25 seconds)    Patient Goals: Button pants and tie shoes, NOT MET     Comments/Assessment: Pt referred for bilateral wrist sprain and bilateral carpal tunnel syndrome. Wrist therapy only auth'ed by [facility]. 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 Pt reports experiencing pain and tingling in bilateral wrists and hands for extended period of time, with progressive symptoms. Pt reports L hand and wrist are more severe than R side. Pt is R hand dominant. Pt reports difficulty with buttons, tying shoes, and tying any objects. Pt reports pain as dull \"most of the time\" but can become sharp and severe at times, more so during work hours. Pt reports neck also is painful, again more so during work hours. Pt reports tingling in fingertips of all digits of L hand and thumb. Pt demo \"claw hand of left hand, with significant intrinsic muscle atrophy of the thenar and hypo-thenar musculature. On the right hand: middle finger only exhibiting mild \"claw position\", with minimal intrinsic muscle atrophy of the right hand observed. Finger extension of these fingers is  innervated by the C7 root. Left thumb also in claw position with thumb postured in same plane as hand.  Functional /grasp of the left hand, specifically lack of active thumb palmar abduction and full finger extension, is compromised by the muscle imbalance noted above. The right hand, by pt report is now starting to exhibit similar symptoms. Pt demo slightly improved bilateral  strength, decrease biateral pinch strength, slightly decreased functional abilities (as evidenced by the Upper Extremity Functional Index; score above), significantly decreased fine motor coordination of the left hand (as evidenced by the 9 Hole Peg Test), no change in AROM. Strength: L  strength increased 4 pounds, R  strength increased 3.3 pounds, and all pinch strength measurements have declined. Pt has difficulty holding onto pinch meter due to thumb weakness of L hand. Coordination: Fine motor skills have significantly declined on the left, 9 Hole Peg test is 15.5 seconds slower this date. AROM: \"Clawing\" of both hands is more pronounced as evidenced by AROM measurements. Recommendations: Discharge at this time.  Pt has seen Dr. Arash Schreiber, [address] [place] [address] Specialist. Pt reports x-rays of the neck and hand have been ordered, awaiting authorization to proceed.        Modalities: Modality Flow Sheet:  START STOP Tx Modality       Electrical Stim:      7-1-20 07/16/20 Ultrasound: __0.8_ W/cm2 x __8_ mins total  Duty factor: _x_100%  __50%  __33% __20%  Head size: 2 cm  MHz: __1mHz  __x 3mHz  Location: Bilateral volar wrists , four minutes each.       Hot Pack:       Cold Pack:      Exercises:  Flow Sheet:  Exercise Reps/Time Weight/Level Comments   AROM - bilateral  10 reps   Home ex program reviewed   Median nerve glides - bilateral   10 reps   Home ex program reviewed   Ultrasound - see parameters above     Discontinued   Soft tissue massage      Not completed     Theraputty   strength  Finger flexion  Finger extension  Palmar pinch  Lateral pinch  Thumb extension  3 jaw golden       10 reps each side Yellow Home ex program reviewed      Pinch pins with foam blocks    Discontinued   Small peg board    Discontinued         Treatment This Date:      Treatment Charges: Mins Units Time In/Out   [x]  Modalities  ultrasound 0 0    [x]  Ther Exercise 36 2 8139 - 1401   []  Manual Therapy      []  Ther Activities      []        []        []        Total Treatment time 36 min       Treatment to Date:  [x] Therapeutic Exercise    [] Modalities:  [] Therapeutic Activity     [] Ultrasound  [] Electrical Stimulation  [] Ortho refit/check             [] Massage   [] Fluidotherapy  [] Neuromuscular Re-education [] Cold/hotpack [x] Iontophoresis: 4 mg/mL  [x] Instruction in Home Exercise Program                     Dexamethasone Sodium  [] Manual Therapy             Phosphate 40-80 mAmin          [] Paraffin        [] Other:    Discharge Status:     [] Pt recovered from conditions. Treatment goals were met. [x] Pt received maximum benefit. No further therapy indicated at this time. [x] Pt to continue exercise/home instructions independently. [] Therapy interrupted due to:    [] Pt has 2 or more no shows/cancels, is discontinued per our policy. [] Pt has completed prescribed number of treatment sessions. [x] Other: Expiration of [facility] auth C-9 07/17/20. If you have any questions or concerns, please don't hesitate to call.   Thank you for your referral.     Time In/Time Out: 0906 - 9856       Electronically signed by CHRISSY Izaguirre/HANNAH, NICANOR

## 2021-01-01 ENCOUNTER — HOSPITAL ENCOUNTER (OUTPATIENT)
Dept: PREADMISSION TESTING | Age: 51
Discharge: HOME | End: 2021-04-05
Payer: COMMERCIAL

## 2021-01-01 ENCOUNTER — HOSPITAL ENCOUNTER (OUTPATIENT)
Dept: GENERAL RADIOLOGY | Age: 51
Discharge: HOME | End: 2021-03-18
Payer: COMMERCIAL

## 2021-01-01 ENCOUNTER — OFFICE VISIT (OUTPATIENT)
Dept: NEUROLOGY | Age: 51
End: 2021-01-01
Payer: COMMERCIAL

## 2021-01-01 ENCOUNTER — APPOINTMENT (OUTPATIENT)
Dept: MRI IMAGING | Age: 51
End: 2021-01-01
Payer: COMMERCIAL

## 2021-01-01 ENCOUNTER — HOSPITAL ENCOUNTER (OUTPATIENT)
Dept: CARDIAC CATH/INVASIVE PROCEDURES | Age: 51
Discharge: HOME | End: 2021-05-24
Payer: COMMERCIAL

## 2021-01-01 ENCOUNTER — TELEPHONE (OUTPATIENT)
Dept: NEUROLOGY | Age: 51
End: 2021-01-01

## 2021-01-01 ENCOUNTER — HOSPITAL ENCOUNTER (OUTPATIENT)
Dept: LAB | Age: 51
Setting detail: SPECIMEN
Discharge: HOME | End: 2021-03-12
Payer: COMMERCIAL

## 2021-01-01 ENCOUNTER — HOSPITAL ENCOUNTER (OUTPATIENT)
Age: 51
Setting detail: OBSERVATION
Discharge: HOME | End: 2021-06-20
Attending: EMERGENCY MEDICINE | Admitting: FAMILY MEDICINE
Payer: COMMERCIAL

## 2021-01-01 ENCOUNTER — APPOINTMENT (OUTPATIENT)
Dept: GENERAL RADIOLOGY | Age: 51
DRG: 056 | End: 2021-01-01
Payer: COMMERCIAL

## 2021-01-01 ENCOUNTER — HOSPITAL ENCOUNTER (OUTPATIENT)
Age: 51
Setting detail: SPECIMEN
Discharge: HOME | End: 2021-02-26
Payer: COMMERCIAL

## 2021-01-01 ENCOUNTER — OFFICE VISIT (OUTPATIENT)
Dept: NEUROSURGERY | Age: 51
End: 2021-01-01
Payer: COMMERCIAL

## 2021-01-01 ENCOUNTER — HOSPITAL ENCOUNTER (OUTPATIENT)
Age: 51
Setting detail: SPECIMEN
Discharge: HOME | End: 2021-02-04
Payer: COMMERCIAL

## 2021-01-01 ENCOUNTER — HOSPITAL ENCOUNTER (OUTPATIENT)
Dept: PREADMISSION TESTING | Age: 51
Discharge: HOME | End: 2021-03-30

## 2021-01-01 ENCOUNTER — HOSPITAL ENCOUNTER (INPATIENT)
Age: 51
LOS: 1 days | DRG: 056 | End: 2021-08-21
Attending: EMERGENCY MEDICINE | Admitting: INTERNAL MEDICINE
Payer: COMMERCIAL

## 2021-01-01 ENCOUNTER — HOSPITAL ENCOUNTER (OUTPATIENT)
Dept: MRI IMAGING | Facility: CLINIC | Age: 51
Discharge: HOME | End: 2021-03-12
Payer: COMMERCIAL

## 2021-01-01 ENCOUNTER — APPOINTMENT (OUTPATIENT)
Dept: GENERAL RADIOLOGY | Age: 51
End: 2021-01-01
Payer: COMMERCIAL

## 2021-01-01 ENCOUNTER — TELEPHONE (OUTPATIENT)
Dept: PRIMARY CARE CLINIC | Age: 51
End: 2021-01-01

## 2021-01-01 ENCOUNTER — APPOINTMENT (OUTPATIENT)
Dept: CT IMAGING | Age: 51
End: 2021-01-01
Payer: COMMERCIAL

## 2021-01-01 ENCOUNTER — TELEPHONE (OUTPATIENT)
Dept: NEUROSURGERY | Age: 51
End: 2021-01-01

## 2021-01-01 VITALS
WEIGHT: 122.3 LBS | DIASTOLIC BLOOD PRESSURE: 82 MMHG | BODY MASS INDEX: 23.09 KG/M2 | TEMPERATURE: 97.2 F | HEIGHT: 61 IN | SYSTOLIC BLOOD PRESSURE: 120 MMHG | HEART RATE: 72 BPM

## 2021-01-01 VITALS
OXYGEN SATURATION: 93 % | HEIGHT: 61 IN | DIASTOLIC BLOOD PRESSURE: 65 MMHG | SYSTOLIC BLOOD PRESSURE: 100 MMHG | BODY MASS INDEX: 18.88 KG/M2 | HEART RATE: 125 BPM | WEIGHT: 100 LBS

## 2021-01-01 VITALS
WEIGHT: 120 LBS | TEMPERATURE: 97.7 F | HEIGHT: 61 IN | BODY MASS INDEX: 22.66 KG/M2 | DIASTOLIC BLOOD PRESSURE: 71 MMHG | SYSTOLIC BLOOD PRESSURE: 109 MMHG | HEART RATE: 91 BPM

## 2021-01-01 VITALS
DIASTOLIC BLOOD PRESSURE: 74 MMHG | HEART RATE: 113 BPM | OXYGEN SATURATION: 98 % | WEIGHT: 112 LBS | RESPIRATION RATE: 18 BRPM | BODY MASS INDEX: 21.14 KG/M2 | HEIGHT: 61 IN | TEMPERATURE: 97 F | SYSTOLIC BLOOD PRESSURE: 119 MMHG

## 2021-01-01 VITALS
HEART RATE: 75 BPM | SYSTOLIC BLOOD PRESSURE: 92 MMHG | DIASTOLIC BLOOD PRESSURE: 55 MMHG | TEMPERATURE: 98.6 F | HEIGHT: 61 IN | RESPIRATION RATE: 24 BRPM | WEIGHT: 114 LBS | OXYGEN SATURATION: 100 % | BODY MASS INDEX: 21.52 KG/M2

## 2021-01-01 VITALS
HEART RATE: 79 BPM | WEIGHT: 107 LBS | HEIGHT: 57 IN | TEMPERATURE: 98.4 F | OXYGEN SATURATION: 100 % | DIASTOLIC BLOOD PRESSURE: 59 MMHG | BODY MASS INDEX: 23.08 KG/M2 | RESPIRATION RATE: 15 BRPM | SYSTOLIC BLOOD PRESSURE: 93 MMHG

## 2021-01-01 VITALS
RESPIRATION RATE: 1 BRPM | DIASTOLIC BLOOD PRESSURE: 50 MMHG | HEART RATE: 187 BPM | WEIGHT: 100 LBS | OXYGEN SATURATION: 73 % | TEMPERATURE: 96.8 F | SYSTOLIC BLOOD PRESSURE: 93 MMHG | HEIGHT: 64 IN | BODY MASS INDEX: 17.07 KG/M2

## 2021-01-01 VITALS
HEART RATE: 72 BPM | WEIGHT: 120 LBS | BODY MASS INDEX: 22.67 KG/M2 | DIASTOLIC BLOOD PRESSURE: 64 MMHG | SYSTOLIC BLOOD PRESSURE: 101 MMHG

## 2021-01-01 VITALS
SYSTOLIC BLOOD PRESSURE: 111 MMHG | DIASTOLIC BLOOD PRESSURE: 79 MMHG | HEART RATE: 120 BPM | HEIGHT: 61 IN | WEIGHT: 100.2 LBS | BODY MASS INDEX: 18.92 KG/M2

## 2021-01-01 VITALS — TEMPERATURE: 96.8 F

## 2021-01-01 DIAGNOSIS — Z01.818 PREOP TESTING: Primary | ICD-10-CM

## 2021-01-01 DIAGNOSIS — R13.10 DYSPHAGIA, UNSPECIFIED TYPE: ICD-10-CM

## 2021-01-01 DIAGNOSIS — G12.21 ALS (AMYOTROPHIC LATERAL SCLEROSIS) (HCC): Primary | ICD-10-CM

## 2021-01-01 DIAGNOSIS — R53.1 GENERALIZED WEAKNESS: ICD-10-CM

## 2021-01-01 DIAGNOSIS — R53.1 GENERALIZED WEAKNESS: Primary | ICD-10-CM

## 2021-01-01 DIAGNOSIS — G12.21 ALS (AMYOTROPHIC LATERAL SCLEROSIS) (HCC): ICD-10-CM

## 2021-01-01 DIAGNOSIS — M50.021 CERVICAL DISC DISORDER AT C4-C5 LEVEL WITH MYELOPATHY: ICD-10-CM

## 2021-01-01 DIAGNOSIS — R42 DIZZINESS: Primary | ICD-10-CM

## 2021-01-01 DIAGNOSIS — G95.20 CERVICAL SPINAL CORD COMPRESSION (HCC): ICD-10-CM

## 2021-01-01 DIAGNOSIS — M50.20 CERVICAL DISC HERNIATION: ICD-10-CM

## 2021-01-01 DIAGNOSIS — R63.4 WEIGHT LOSS: ICD-10-CM

## 2021-01-01 DIAGNOSIS — R20.2 TINGLING IN EXTREMITIES: ICD-10-CM

## 2021-01-01 DIAGNOSIS — M50.20 CERVICAL DISC HERNIATION: Primary | ICD-10-CM

## 2021-01-01 DIAGNOSIS — G95.9 CERVICAL MYELOPATHY (HCC): ICD-10-CM

## 2021-01-01 DIAGNOSIS — R53.1 WEAKNESS: ICD-10-CM

## 2021-01-01 LAB
-: ABNORMAL
ABO/RH: NORMAL
ABSOLUTE EOS #: 0 K/UL (ref 0–0.4)
ABSOLUTE EOS #: 0.03 K/UL (ref 0–0.44)
ABSOLUTE IMMATURE GRANULOCYTE: 0.12 K/UL (ref 0–0.3)
ABSOLUTE IMMATURE GRANULOCYTE: <0.03 K/UL (ref 0–0.3)
ABSOLUTE LYMPH #: 0.97 K/UL (ref 1–4.8)
ABSOLUTE LYMPH #: 1.14 K/UL (ref 1.1–3.7)
ABSOLUTE MONO #: 0.28 K/UL (ref 0.1–1.2)
ABSOLUTE MONO #: 1.57 K/UL (ref 0.2–0.8)
ALBUMIN SERPL-MCNC: 4.3 G/DL (ref 3.5–5.2)
ALBUMIN/GLOBULIN RATIO: ABNORMAL (ref 1–2.5)
ALP BLD-CCNC: 56 U/L (ref 35–104)
ALT SERPL-CCNC: 23 U/L (ref 5–33)
AMORPHOUS: ABNORMAL
ANION GAP SERPL CALCULATED.3IONS-SCNC: 5 MMOL/L (ref 9–17)
ANION GAP SERPL CALCULATED.3IONS-SCNC: 6 MMOL/L (ref 9–17)
ANION GAP SERPL CALCULATED.3IONS-SCNC: 9 MMOL/L (ref 9–17)
ANTI-NUCLEAR ANTIBODY (ANA): NEGATIVE
ANTIBODY SCREEN: NEGATIVE
ARM BAND NUMBER: NORMAL
AST SERPL-CCNC: 28 U/L
BACTERIA: ABNORMAL
BASOPHILS # BLD: 0 %
BASOPHILS # BLD: 1 % (ref 0–2)
BASOPHILS ABSOLUTE: 0 K/UL (ref 0–0.2)
BASOPHILS ABSOLUTE: 0.05 K/UL (ref 0–0.2)
BILIRUB SERPL-MCNC: 0.2 MG/DL (ref 0.3–1.2)
BILIRUBIN URINE: NEGATIVE
BUN BLDV-MCNC: 21 MG/DL (ref 6–20)
BUN BLDV-MCNC: 8 MG/DL (ref 6–20)
BUN BLDV-MCNC: 9 MG/DL (ref 6–20)
BUN/CREAT BLD: ABNORMAL (ref 9–20)
CALCIUM SERPL-MCNC: 10 MG/DL (ref 8.6–10.4)
CALCIUM SERPL-MCNC: 8.7 MG/DL (ref 8.6–10.4)
CALCIUM SERPL-MCNC: 9.6 MG/DL (ref 8.6–10.4)
CASTS UA: ABNORMAL /LPF (ref 0–8)
CHLORIDE BLD-SCNC: 101 MMOL/L (ref 98–107)
CHLORIDE BLD-SCNC: 95 MMOL/L (ref 98–107)
CHLORIDE BLD-SCNC: 97 MMOL/L (ref 98–107)
CO2: 33 MMOL/L (ref 20–31)
CO2: 35 MMOL/L (ref 20–31)
CO2: 42 MMOL/L (ref 20–31)
COLOR: YELLOW
COMMENT UA: ABNORMAL
CREAT SERPL-MCNC: 0.34 MG/DL (ref 0.5–0.9)
CREAT SERPL-MCNC: 0.44 MG/DL (ref 0.5–0.9)
CREAT SERPL-MCNC: <0.4 MG/DL (ref 0.5–0.9)
CRYSTALS, UA: ABNORMAL /HPF
D-DIMER QUANTITATIVE: 0.42 MG/L FEU
DIFFERENTIAL TYPE: ABNORMAL
DIFFERENTIAL TYPE: ABNORMAL
EKG ATRIAL RATE: 100 BPM
EKG ATRIAL RATE: 96 BPM
EKG P AXIS: 65 DEGREES
EKG P AXIS: 75 DEGREES
EKG P-R INTERVAL: 122 MS
EKG P-R INTERVAL: 138 MS
EKG Q-T INTERVAL: 360 MS
EKG Q-T INTERVAL: 400 MS
EKG QRS DURATION: 52 MS
EKG QRS DURATION: 62 MS
EKG QTC CALCULATION (BAZETT): 454 MS
EKG QTC CALCULATION (BAZETT): 510 MS
EKG R AXIS: 17 DEGREES
EKG R AXIS: 2 DEGREES
EKG T AXIS: -174 DEGREES
EKG T AXIS: 23 DEGREES
EKG VENTRICULAR RATE: 96 BPM
EKG VENTRICULAR RATE: 98 BPM
EOSINOPHILS RELATIVE PERCENT: 0 % (ref 1–4)
EOSINOPHILS RELATIVE PERCENT: 1 % (ref 1–4)
EPITHELIAL CELLS UA: ABNORMAL /HPF (ref 0–5)
ESTIMATED AVERAGE GLUCOSE: 103 MG/DL
EXPIRATION DATE: NORMAL
FOLATE: 11.5 NG/ML
GFR AFRICAN AMERICAN: >60 ML/MIN
GFR AFRICAN AMERICAN: >60 ML/MIN
GFR AFRICAN AMERICAN: ABNORMAL ML/MIN
GFR NON-AFRICAN AMERICAN: >60 ML/MIN
GFR NON-AFRICAN AMERICAN: ABNORMAL ML/MIN
GFR SERPL CREATININE-BSD FRML MDRD: >60 ML/MIN
GFR SERPL CREATININE-BSD FRML MDRD: ABNORMAL ML/MIN/{1.73_M2}
GFR SERPL CREATININE-BSD FRML MDRD: NORMAL ML/MIN/{1.73_M2}
GLUCOSE BLD-MCNC: 103 MG/DL (ref 70–99)
GLUCOSE BLD-MCNC: 121 MG/DL (ref 70–99)
GLUCOSE BLD-MCNC: 184 MG/DL (ref 70–99)
GLUCOSE BLD-MCNC: 95 MG/DL (ref 74–100)
GLUCOSE URINE: NEGATIVE
HBA1C MFR BLD: 5.2 % (ref 4–6)
HCT VFR BLD CALC: 38.5 % (ref 36.3–47.1)
HCT VFR BLD CALC: 42.4 % (ref 36.3–47.1)
HCT VFR BLD CALC: 42.8 % (ref 36.3–47.1)
HCT VFR BLD CALC: 46.7 % (ref 36.3–47.1)
HEMOGLOBIN: 10.9 G/DL (ref 11.9–15.1)
HEMOGLOBIN: 12.5 G/DL (ref 11.9–15.1)
HEMOGLOBIN: 12.5 G/DL (ref 11.9–15.1)
HEMOGLOBIN: 12.7 G/DL (ref 11.9–15.1)
IMMATURE GRANULOCYTES: 0 %
IMMATURE GRANULOCYTES: 1 %
INR BLD: 1
KETONES, URINE: ABNORMAL
LACTIC ACID: 1.2 MMOL/L (ref 0.5–2.2)
LEUKOCYTE ESTERASE, URINE: NEGATIVE
LYMPHOCYTES # BLD: 18 % (ref 24–43)
LYMPHOCYTES # BLD: 8 % (ref 24–44)
MCH RBC QN AUTO: 22 PG (ref 25.2–33.5)
MCH RBC QN AUTO: 23.2 PG (ref 25.2–33.5)
MCH RBC QN AUTO: 23.5 PG (ref 25.2–33.5)
MCHC RBC AUTO-ENTMCNC: 27.2 G/DL (ref 28.4–34.8)
MCHC RBC AUTO-ENTMCNC: 28.3 G/DL (ref 28.4–34.8)
MCHC RBC AUTO-ENTMCNC: 29.2 G/DL (ref 28.4–34.8)
MCV RBC AUTO: 80.5 FL (ref 82.6–102.9)
MCV RBC AUTO: 80.9 FL (ref 82.6–102.9)
MCV RBC AUTO: 81.9 FL (ref 82.6–102.9)
MONOCYTES # BLD: 13 % (ref 1–7)
MONOCYTES # BLD: 5 % (ref 3–12)
MORPHOLOGY: ABNORMAL
MUCUS: ABNORMAL
NITRITE, URINE: POSITIVE
NRBC AUTOMATED: 0 PER 100 WBC
NRBC AUTOMATED: 0 PER 100 WBC
NRBC AUTOMATED: ABNORMAL PER 100 WBC
OTHER OBSERVATIONS UA: ABNORMAL
PDW BLD-RTO: 15.9 % (ref 11.8–14.4)
PH UA: 7.5 (ref 5–8)
PLATELET # BLD: 252 K/UL (ref 138–453)
PLATELET # BLD: 288 K/UL (ref 138–453)
PLATELET # BLD: 293 K/UL (ref 138–453)
PLATELET # BLD: ABNORMAL K/UL (ref 130–400)
PLATELET ESTIMATE: ABNORMAL
PLATELET ESTIMATE: ABNORMAL
PMV BLD AUTO: 10.3 FL (ref 8.1–13.5)
PMV BLD AUTO: 10.4 FL (ref 8.1–13.5)
PMV BLD AUTO: ABNORMAL FL (ref 6–12)
POC BUN: 8 MG/DL (ref 8–26)
POC CHLORIDE: 102 MMOL/L (ref 98–107)
POC CREATININE: 0.66 MG/DL (ref 0.51–1.19)
POC HEMATOCRIT: 42 % (ref 36–46)
POC HEMOGLOBIN: 14.4 G/DL (ref 12–16)
POC POTASSIUM: 3.6 MMOL/L (ref 3.5–4.5)
POC SODIUM: 146 MMOL/L (ref 138–146)
POTASSIUM SERPL-SCNC: 3.9 MMOL/L (ref 3.7–5.3)
POTASSIUM SERPL-SCNC: 4.4 MMOL/L (ref 3.7–5.3)
POTASSIUM SERPL-SCNC: 4.8 MMOL/L (ref 3.7–5.3)
PROTEIN UA: NEGATIVE
PROTHROMBIN TIME: 10.8 SEC (ref 9.1–12.3)
RBC # BLD: 4.7 M/UL (ref 3.95–5.11)
RBC # BLD: 5.32 M/UL (ref 3.95–5.11)
RBC # BLD: 5.77 M/UL (ref 3.95–5.11)
RBC # BLD: ABNORMAL 10*6/UL
RBC # BLD: ABNORMAL 10*6/UL
RBC UA: ABNORMAL /HPF (ref 0–4)
RENAL EPITHELIAL, UA: ABNORMAL /HPF
SARS-COV-2: NORMAL
SARS-COV-2: NOT DETECTED
SEDIMENTATION RATE, ERYTHROCYTE: 33 MM (ref 0–30)
SEG NEUTROPHILS: 75 % (ref 36–65)
SEG NEUTROPHILS: 78 % (ref 36–66)
SEGMENTED NEUTROPHILS ABSOLUTE COUNT: 4.66 K/UL (ref 1.5–8.1)
SEGMENTED NEUTROPHILS ABSOLUTE COUNT: 9.44 K/UL (ref 1.8–7.7)
SODIUM BLD-SCNC: 139 MMOL/L (ref 135–144)
SODIUM BLD-SCNC: 141 MMOL/L (ref 135–144)
SODIUM BLD-SCNC: 143 MMOL/L (ref 135–144)
SOURCE: NORMAL
SPECIFIC GRAVITY UA: 1.03 (ref 1–1.03)
TOTAL PROTEIN: 7.7 G/DL (ref 6.4–8.3)
TRICHOMONAS: ABNORMAL
TROPONIN INTERP: ABNORMAL
TROPONIN T: ABNORMAL NG/ML
TROPONIN, HIGH SENSITIVITY: 64 NG/L (ref 0–14)
TROPONIN, HIGH SENSITIVITY: 67 NG/L (ref 0–14)
TROPONIN, HIGH SENSITIVITY: 75 NG/L (ref 0–14)
TSH SERPL DL<=0.05 MIU/L-ACNC: 3.28 MIU/L (ref 0.3–5)
TSH SERPL DL<=0.05 MIU/L-ACNC: 5.11 MIU/L (ref 0.3–5)
TURBIDITY: ABNORMAL
URINE HGB: ABNORMAL
UROBILINOGEN, URINE: NORMAL
VITAMIN B-12: 714 PG/ML (ref 232–1245)
VITAMIN B-12: 754 PG/ML (ref 232–1245)
WBC # BLD: 12.1 K/UL (ref 3.5–11.3)
WBC # BLD: 6.1 K/UL (ref 3.5–11.3)
WBC # BLD: 6.2 K/UL (ref 3.5–11.3)
WBC # BLD: ABNORMAL 10*3/UL
WBC # BLD: ABNORMAL 10*3/UL
WBC UA: ABNORMAL /HPF (ref 0–5)
YEAST: ABNORMAL

## 2021-01-01 PROCEDURE — 6360000004 HC RX CONTRAST MEDICATION

## 2021-01-01 PROCEDURE — 95910 NRV CNDJ TEST 7-8 STUDIES: CPT | Performed by: PSYCHIATRY & NEUROLOGY

## 2021-01-01 PROCEDURE — 84484 ASSAY OF TROPONIN QUANT: CPT

## 2021-01-01 PROCEDURE — 82565 ASSAY OF CREATININE: CPT

## 2021-01-01 PROCEDURE — 99283 EMERGENCY DEPT VISIT LOW MDM: CPT

## 2021-01-01 PROCEDURE — 93005 ELECTROCARDIOGRAM TRACING: CPT

## 2021-01-01 PROCEDURE — 85014 HEMATOCRIT: CPT

## 2021-01-01 PROCEDURE — 86900 BLOOD TYPING SEROLOGIC ABO: CPT

## 2021-01-01 PROCEDURE — 2580000003 HC RX 258: Performed by: NURSE PRACTITIONER

## 2021-01-01 PROCEDURE — 96361 HYDRATE IV INFUSION ADD-ON: CPT

## 2021-01-01 PROCEDURE — 95869 NDL EMG THRC PARASPINAL MUSC: CPT | Performed by: PSYCHIATRY & NEUROLOGY

## 2021-01-01 PROCEDURE — 99205 OFFICE O/P NEW HI 60 MIN: CPT | Performed by: PSYCHIATRY & NEUROLOGY

## 2021-01-01 PROCEDURE — 99214 OFFICE O/P EST MOD 30 MIN: CPT | Performed by: NEUROLOGICAL SURGERY

## 2021-01-01 PROCEDURE — 1200000000 HC SEMI PRIVATE

## 2021-01-01 PROCEDURE — G0378 HOSPITAL OBSERVATION PER HR: HCPCS

## 2021-01-01 PROCEDURE — 2580000003 HC RX 258: Performed by: INTERNAL MEDICINE

## 2021-01-01 PROCEDURE — 6360000002 HC RX W HCPCS

## 2021-01-01 PROCEDURE — 99204 OFFICE O/P NEW MOD 45 MIN: CPT | Performed by: NEUROLOGICAL SURGERY

## 2021-01-01 PROCEDURE — 99285 EMERGENCY DEPT VISIT HI MDM: CPT

## 2021-01-01 PROCEDURE — 84520 ASSAY OF UREA NITROGEN: CPT

## 2021-01-01 PROCEDURE — 92611 MOTION FLUOROSCOPY/SWALLOW: CPT

## 2021-01-01 PROCEDURE — APPSS180 APP SPLIT SHARED TIME > 60 MINUTES: Performed by: NURSE PRACTITIONER

## 2021-01-01 PROCEDURE — 6360000002 HC RX W HCPCS: Performed by: STUDENT IN AN ORGANIZED HEALTH CARE EDUCATION/TRAINING PROGRAM

## 2021-01-01 PROCEDURE — 7100000001 HC PACU RECOVERY - ADDTL 15 MIN

## 2021-01-01 PROCEDURE — C1769 GUIDE WIRE: HCPCS

## 2021-01-01 PROCEDURE — 82435 ASSAY OF BLOOD CHLORIDE: CPT

## 2021-01-01 PROCEDURE — U0005 INFEC AGEN DETEC AMPLI PROBE: HCPCS

## 2021-01-01 PROCEDURE — 99214 OFFICE O/P EST MOD 30 MIN: CPT | Performed by: PSYCHIATRY & NEUROLOGY

## 2021-01-01 PROCEDURE — 6360000004 HC RX CONTRAST MEDICATION: Performed by: STUDENT IN AN ORGANIZED HEALTH CARE EDUCATION/TRAINING PROGRAM

## 2021-01-01 PROCEDURE — 2580000003 HC RX 258: Performed by: EMERGENCY MEDICINE

## 2021-01-01 PROCEDURE — 80048 BASIC METABOLIC PNL TOTAL CA: CPT

## 2021-01-01 PROCEDURE — 85379 FIBRIN DEGRADATION QUANT: CPT

## 2021-01-01 PROCEDURE — 84295 ASSAY OF SERUM SODIUM: CPT

## 2021-01-01 PROCEDURE — 71045 X-RAY EXAM CHEST 1 VIEW: CPT

## 2021-01-01 PROCEDURE — 96376 TX/PRO/DX INJ SAME DRUG ADON: CPT

## 2021-01-01 PROCEDURE — 85025 COMPLETE CBC W/AUTO DIFF WBC: CPT

## 2021-01-01 PROCEDURE — 93005 ELECTROCARDIOGRAM TRACING: CPT | Performed by: EMERGENCY MEDICINE

## 2021-01-01 PROCEDURE — C1887 CATHETER, GUIDING: HCPCS

## 2021-01-01 PROCEDURE — 70496 CT ANGIOGRAPHY HEAD: CPT

## 2021-01-01 PROCEDURE — 96374 THER/PROPH/DIAG INJ IV PUSH: CPT

## 2021-01-01 PROCEDURE — 99217 PR OBSERVATION CARE DISCHARGE MANAGEMENT: CPT | Performed by: FAMILY MEDICINE

## 2021-01-01 PROCEDURE — 95886 MUSC TEST DONE W/N TEST COMP: CPT | Performed by: PSYCHIATRY & NEUROLOGY

## 2021-01-01 PROCEDURE — 85018 HEMOGLOBIN: CPT

## 2021-01-01 PROCEDURE — U0003 INFECTIOUS AGENT DETECTION BY NUCLEIC ACID (DNA OR RNA); SEVERE ACUTE RESPIRATORY SYNDROME CORONAVIRUS 2 (SARS-COV-2) (CORONAVIRUS DISEASE [COVID-19]), AMPLIFIED PROBE TECHNIQUE, MAKING USE OF HIGH THROUGHPUT TECHNOLOGIES AS DESCRIBED BY CMS-2020-01-R: HCPCS

## 2021-01-01 PROCEDURE — 72141 MRI NECK SPINE W/O DYE: CPT

## 2021-01-01 PROCEDURE — 84132 ASSAY OF SERUM POTASSIUM: CPT

## 2021-01-01 PROCEDURE — 80053 COMPREHEN METABOLIC PANEL: CPT

## 2021-01-01 PROCEDURE — 82947 ASSAY GLUCOSE BLOOD QUANT: CPT

## 2021-01-01 PROCEDURE — 86901 BLOOD TYPING SEROLOGIC RH(D): CPT

## 2021-01-01 PROCEDURE — C1894 INTRO/SHEATH, NON-LASER: HCPCS

## 2021-01-01 PROCEDURE — 6360000002 HC RX W HCPCS: Performed by: EMERGENCY MEDICINE

## 2021-01-01 PROCEDURE — 70553 MRI BRAIN STEM W/O & W/DYE: CPT

## 2021-01-01 PROCEDURE — 7100000000 HC PACU RECOVERY - FIRST 15 MIN

## 2021-01-01 PROCEDURE — 2709999900 HC NON-CHARGEABLE SUPPLY

## 2021-01-01 PROCEDURE — 93458 L HRT ARTERY/VENTRICLE ANGIO: CPT | Performed by: INTERNAL MEDICINE

## 2021-01-01 PROCEDURE — 85610 PROTHROMBIN TIME: CPT

## 2021-01-01 PROCEDURE — 99222 1ST HOSP IP/OBS MODERATE 55: CPT | Performed by: NURSE PRACTITIONER

## 2021-01-01 PROCEDURE — 86850 RBC ANTIBODY SCREEN: CPT

## 2021-01-01 PROCEDURE — 81001 URINALYSIS AUTO W/SCOPE: CPT

## 2021-01-01 PROCEDURE — 85049 AUTOMATED PLATELET COUNT: CPT

## 2021-01-01 PROCEDURE — 36415 COLL VENOUS BLD VENIPUNCTURE: CPT

## 2021-01-01 PROCEDURE — 2500000003 HC RX 250 WO HCPCS

## 2021-01-01 PROCEDURE — A9579 GAD-BASE MR CONTRAST NOS,1ML: HCPCS | Performed by: STUDENT IN AN ORGANIZED HEALTH CARE EDUCATION/TRAINING PROGRAM

## 2021-01-01 PROCEDURE — 99215 OFFICE O/P EST HI 40 MIN: CPT | Performed by: PSYCHIATRY & NEUROLOGY

## 2021-01-01 PROCEDURE — 6370000000 HC RX 637 (ALT 250 FOR IP): Performed by: NURSE PRACTITIONER

## 2021-01-01 PROCEDURE — 93005 ELECTROCARDIOGRAM TRACING: CPT | Performed by: NEUROLOGICAL SURGERY

## 2021-01-01 PROCEDURE — 83605 ASSAY OF LACTIC ACID: CPT

## 2021-01-01 PROCEDURE — 85027 COMPLETE CBC AUTOMATED: CPT

## 2021-01-01 PROCEDURE — 70551 MRI BRAIN STEM W/O DYE: CPT

## 2021-01-01 PROCEDURE — 93010 ELECTROCARDIOGRAM REPORT: CPT | Performed by: INTERNAL MEDICINE

## 2021-01-01 PROCEDURE — 74230 X-RAY XM SWLNG FUNCJ C+: CPT

## 2021-01-01 PROCEDURE — 99222 1ST HOSP IP/OBS MODERATE 55: CPT | Performed by: INTERNAL MEDICINE

## 2021-01-01 PROCEDURE — 6370000000 HC RX 637 (ALT 250 FOR IP): Performed by: STUDENT IN AN ORGANIZED HEALTH CARE EDUCATION/TRAINING PROGRAM

## 2021-01-01 RX ORDER — ONDANSETRON 4 MG/1
4 TABLET, ORALLY DISINTEGRATING ORAL EVERY 8 HOURS PRN
Status: DISCONTINUED | OUTPATIENT
Start: 2021-01-01 | End: 2021-01-01 | Stop reason: HOSPADM

## 2021-01-01 RX ORDER — MORPHINE SULFATE 4 MG/ML
4 INJECTION, SOLUTION INTRAMUSCULAR; INTRAVENOUS ONCE
Status: COMPLETED | OUTPATIENT
Start: 2021-01-01 | End: 2021-01-01

## 2021-01-01 RX ORDER — SODIUM CHLORIDE 9 MG/ML
25 INJECTION, SOLUTION INTRAVENOUS PRN
Status: DISCONTINUED | OUTPATIENT
Start: 2021-01-01 | End: 2021-01-01 | Stop reason: HOSPADM

## 2021-01-01 RX ORDER — SODIUM CHLORIDE 0.9 % (FLUSH) 0.9 %
10 SYRINGE (ML) INJECTION PRN
Status: DISCONTINUED | OUTPATIENT
Start: 2021-01-01 | End: 2021-01-01 | Stop reason: HOSPADM

## 2021-01-01 RX ORDER — IPRATROPIUM BROMIDE AND ALBUTEROL SULFATE 2.5; .5 MG/3ML; MG/3ML
1 SOLUTION RESPIRATORY (INHALATION)
Status: DISCONTINUED | OUTPATIENT
Start: 2021-01-01 | End: 2021-01-01

## 2021-01-01 RX ORDER — ACETAMINOPHEN 650 MG/1
650 SUPPOSITORY RECTAL EVERY 6 HOURS PRN
Status: DISCONTINUED | OUTPATIENT
Start: 2021-01-01 | End: 2021-01-01 | Stop reason: HOSPADM

## 2021-01-01 RX ORDER — SODIUM CHLORIDE 0.9 % (FLUSH) 0.9 %
5-40 SYRINGE (ML) INJECTION EVERY 12 HOURS SCHEDULED
Status: DISCONTINUED | OUTPATIENT
Start: 2021-01-01 | End: 2021-01-01 | Stop reason: HOSPADM

## 2021-01-01 RX ORDER — MIDAZOLAM HYDROCHLORIDE 2 MG/2ML
2 INJECTION, SOLUTION INTRAMUSCULAR; INTRAVENOUS ONCE
Status: COMPLETED | OUTPATIENT
Start: 2021-01-01 | End: 2021-01-01

## 2021-01-01 RX ORDER — ONDANSETRON 2 MG/ML
4 INJECTION INTRAMUSCULAR; INTRAVENOUS EVERY 6 HOURS PRN
Status: DISCONTINUED | OUTPATIENT
Start: 2021-01-01 | End: 2021-01-01 | Stop reason: HOSPADM

## 2021-01-01 RX ORDER — POLYETHYLENE GLYCOL 3350 17 G/17G
17 POWDER, FOR SOLUTION ORAL DAILY PRN
Status: DISCONTINUED | OUTPATIENT
Start: 2021-01-01 | End: 2021-01-01 | Stop reason: HOSPADM

## 2021-01-01 RX ORDER — POTASSIUM CHLORIDE 20 MEQ/1
40 TABLET, EXTENDED RELEASE ORAL PRN
Status: DISCONTINUED | OUTPATIENT
Start: 2021-01-01 | End: 2021-01-01 | Stop reason: HOSPADM

## 2021-01-01 RX ORDER — MULTIVITAMIN WITH IRON
1 TABLET ORAL DAILY
Status: DISCONTINUED | OUTPATIENT
Start: 2021-01-01 | End: 2021-01-01 | Stop reason: HOSPADM

## 2021-01-01 RX ORDER — ACETAMINOPHEN 325 MG/1
650 TABLET ORAL EVERY 6 HOURS PRN
Status: DISCONTINUED | OUTPATIENT
Start: 2021-01-01 | End: 2021-01-01 | Stop reason: HOSPADM

## 2021-01-01 RX ORDER — MIDAZOLAM HYDROCHLORIDE 2 MG/2ML
1 INJECTION, SOLUTION INTRAMUSCULAR; INTRAVENOUS ONCE
Status: COMPLETED | OUTPATIENT
Start: 2021-01-01 | End: 2021-01-01

## 2021-01-01 RX ORDER — SODIUM CHLORIDE 9 MG/ML
INJECTION, SOLUTION INTRAVENOUS CONTINUOUS
Status: DISCONTINUED | OUTPATIENT
Start: 2021-01-01 | End: 2021-01-01 | Stop reason: HOSPADM

## 2021-01-01 RX ORDER — MECLIZINE HCL 12.5 MG/1
12.5 TABLET ORAL 3 TIMES DAILY PRN
COMMUNITY

## 2021-01-01 RX ORDER — RILUZOLE 50 MG/1
50 TABLET, FILM COATED ORAL EVERY 12 HOURS
COMMUNITY

## 2021-01-01 RX ORDER — SODIUM CHLORIDE 0.9 % (FLUSH) 0.9 %
5-40 SYRINGE (ML) INJECTION PRN
Status: DISCONTINUED | OUTPATIENT
Start: 2021-01-01 | End: 2021-01-01 | Stop reason: HOSPADM

## 2021-01-01 RX ORDER — SODIUM CHLORIDE, SODIUM LACTATE, POTASSIUM CHLORIDE, CALCIUM CHLORIDE 600; 310; 30; 20 MG/100ML; MG/100ML; MG/100ML; MG/100ML
1000 INJECTION, SOLUTION INTRAVENOUS CONTINUOUS
Status: CANCELLED | OUTPATIENT
Start: 2021-01-01

## 2021-01-01 RX ORDER — IPRATROPIUM BROMIDE AND ALBUTEROL SULFATE 2.5; .5 MG/3ML; MG/3ML
1 SOLUTION RESPIRATORY (INHALATION) EVERY 4 HOURS PRN
Status: DISCONTINUED | OUTPATIENT
Start: 2021-01-01 | End: 2021-01-01 | Stop reason: HOSPADM

## 2021-01-01 RX ORDER — MORPHINE SULFATE 4 MG/ML
INJECTION, SOLUTION INTRAMUSCULAR; INTRAVENOUS
Status: COMPLETED
Start: 2021-01-01 | End: 2021-01-01

## 2021-01-01 RX ORDER — MECLIZINE HCL 12.5 MG/1
12.5 TABLET ORAL 3 TIMES DAILY PRN
Qty: 15 TABLET | Refills: 0 | Status: SHIPPED | OUTPATIENT
Start: 2021-01-01 | End: 2021-01-01

## 2021-01-01 RX ORDER — ALBUTEROL SULFATE 2.5 MG/3ML
2.5 SOLUTION RESPIRATORY (INHALATION)
Status: DISCONTINUED | OUTPATIENT
Start: 2021-01-01 | End: 2021-01-01 | Stop reason: HOSPADM

## 2021-01-01 RX ORDER — MORPHINE SULFATE/PF 50 MG/50ML
1 PATIENT CONTROLLED ANALGESIA SYRINGE INTRAVENOUS CONTINUOUS
Status: DISCONTINUED | OUTPATIENT
Start: 2021-01-01 | End: 2021-01-01 | Stop reason: HOSPADM

## 2021-01-01 RX ORDER — RILUZOLE 50 MG/1
50 TABLET, FILM COATED ORAL EVERY 12 HOURS
Qty: 60 TABLET | Refills: 5 | Status: SHIPPED | OUTPATIENT
Start: 2021-01-01 | End: 2021-01-01

## 2021-01-01 RX ORDER — MAGNESIUM SULFATE 1 G/100ML
1000 INJECTION INTRAVENOUS PRN
Status: DISCONTINUED | OUTPATIENT
Start: 2021-01-01 | End: 2021-01-01 | Stop reason: HOSPADM

## 2021-01-01 RX ORDER — SODIUM CHLORIDE 0.9 % (FLUSH) 0.9 %
5-40 SYRINGE (ML) INJECTION EVERY 12 HOURS SCHEDULED
Status: CANCELLED | OUTPATIENT
Start: 2021-01-01

## 2021-01-01 RX ORDER — ASPIRIN 81 MG/1
324 TABLET, CHEWABLE ORAL ONCE
Status: COMPLETED | OUTPATIENT
Start: 2021-01-01 | End: 2021-01-01

## 2021-01-01 RX ORDER — SODIUM CHLORIDE 0.9 % (FLUSH) 0.9 %
5-40 SYRINGE (ML) INJECTION PRN
Status: CANCELLED | OUTPATIENT
Start: 2021-01-01

## 2021-01-01 RX ORDER — LANOLIN ALCOHOL/MO/W.PET/CERES
325 CREAM (GRAM) TOPICAL EVERY OTHER DAY
Status: DISCONTINUED | OUTPATIENT
Start: 2021-01-01 | End: 2021-01-01 | Stop reason: HOSPADM

## 2021-01-01 RX ORDER — POTASSIUM CHLORIDE 7.45 MG/ML
10 INJECTION INTRAVENOUS PRN
Status: DISCONTINUED | OUTPATIENT
Start: 2021-01-01 | End: 2021-01-01 | Stop reason: HOSPADM

## 2021-01-01 RX ORDER — PANTOPRAZOLE SODIUM 40 MG/1
40 TABLET, DELAYED RELEASE ORAL
Status: DISCONTINUED | OUTPATIENT
Start: 2021-01-01 | End: 2021-01-01 | Stop reason: HOSPADM

## 2021-01-01 RX ORDER — MIDAZOLAM HYDROCHLORIDE 1 MG/ML
INJECTION INTRAMUSCULAR; INTRAVENOUS
Status: COMPLETED
Start: 2021-01-01 | End: 2021-01-01

## 2021-01-01 RX ORDER — RILUZOLE 50 MG/1
50 TABLET, FILM COATED ORAL EVERY 12 HOURS
Status: DISCONTINUED | OUTPATIENT
Start: 2021-01-01 | End: 2021-01-01 | Stop reason: HOSPADM

## 2021-01-01 RX ORDER — ACETAMINOPHEN 325 MG/1
650 TABLET ORAL EVERY 4 HOURS PRN
Status: DISCONTINUED | OUTPATIENT
Start: 2021-01-01 | End: 2021-01-01 | Stop reason: HOSPADM

## 2021-01-01 RX ORDER — VITAMIN B COMPLEX
2000 TABLET ORAL DAILY
Status: DISCONTINUED | OUTPATIENT
Start: 2021-01-01 | End: 2021-01-01 | Stop reason: HOSPADM

## 2021-01-01 RX ORDER — ASPIRIN 81 MG/1
81 TABLET ORAL DAILY
COMMUNITY

## 2021-01-01 RX ORDER — SODIUM CHLORIDE, SODIUM LACTATE, POTASSIUM CHLORIDE, AND CALCIUM CHLORIDE .6; .31; .03; .02 G/100ML; G/100ML; G/100ML; G/100ML
1000 INJECTION, SOLUTION INTRAVENOUS ONCE
Status: COMPLETED | OUTPATIENT
Start: 2021-01-01 | End: 2021-01-01

## 2021-01-01 RX ORDER — SODIUM CHLORIDE 9 MG/ML
25 INJECTION, SOLUTION INTRAVENOUS PRN
Status: CANCELLED | OUTPATIENT
Start: 2021-01-01

## 2021-01-01 RX ADMIN — Medication 1 TABLET: at 09:44

## 2021-01-01 RX ADMIN — FERROUS SULFATE TAB EC 325 MG (65 MG FE EQUIVALENT) 325 MG: 325 (65 FE) TABLET DELAYED RESPONSE at 09:44

## 2021-01-01 RX ADMIN — PANTOPRAZOLE SODIUM 40 MG: 40 TABLET, DELAYED RELEASE ORAL at 09:44

## 2021-01-01 RX ADMIN — MIDAZOLAM HYDROCHLORIDE 0.5 MG: 1 INJECTION, SOLUTION INTRAMUSCULAR; INTRAVENOUS at 18:29

## 2021-01-01 RX ADMIN — MIDAZOLAM HYDROCHLORIDE 0.5 MG: 2 INJECTION, SOLUTION INTRAMUSCULAR; INTRAVENOUS at 18:29

## 2021-01-01 RX ADMIN — GADOTERIDOL 10 ML: 279.3 INJECTION, SOLUTION INTRAVENOUS at 18:29

## 2021-01-01 RX ADMIN — Medication 2000 UNITS: at 09:44

## 2021-01-01 RX ADMIN — Medication 4 MG: at 03:16

## 2021-01-01 RX ADMIN — MORPHINE SULFATE 1 MG/HR: 1 INJECTION INTRAVENOUS at 03:30

## 2021-01-01 RX ADMIN — IOPAMIDOL 90 ML: 755 INJECTION, SOLUTION INTRAVENOUS at 14:26

## 2021-01-01 RX ADMIN — MORPHINE SULFATE 4 MG: 4 INJECTION, SOLUTION INTRAMUSCULAR; INTRAVENOUS at 01:41

## 2021-01-01 RX ADMIN — SODIUM CHLORIDE, POTASSIUM CHLORIDE, SODIUM LACTATE AND CALCIUM CHLORIDE 1000 ML: 600; 310; 30; 20 INJECTION, SOLUTION INTRAVENOUS at 00:40

## 2021-01-01 RX ADMIN — SODIUM CHLORIDE, PRESERVATIVE FREE 10 ML: 5 INJECTION INTRAVENOUS at 08:46

## 2021-01-01 RX ADMIN — SODIUM CHLORIDE: 9 INJECTION, SOLUTION INTRAVENOUS at 11:10

## 2021-01-01 RX ADMIN — SODIUM CHLORIDE, PRESERVATIVE FREE 10 ML: 5 INJECTION INTRAVENOUS at 22:41

## 2021-01-01 RX ADMIN — MIDAZOLAM HYDROCHLORIDE 2 MG: 1 INJECTION, SOLUTION INTRAMUSCULAR; INTRAVENOUS at 17:15

## 2021-01-01 RX ADMIN — ASPIRIN 324 MG: 81 TABLET, CHEWABLE ORAL at 15:22

## 2021-01-01 RX ADMIN — Medication 4 MG: at 01:41

## 2021-01-01 ASSESSMENT — ENCOUNTER SYMPTOMS
RHINORRHEA: 0
EYES NEGATIVE: 1
CONSTIPATION: 0
GASTROINTESTINAL NEGATIVE: 1
NAUSEA: 0
RESPIRATORY NEGATIVE: 1
EYES NEGATIVE: 1
RESPIRATORY NEGATIVE: 1
ABDOMINAL DISTENTION: 0
ALLERGIC/IMMUNOLOGIC NEGATIVE: 1
GASTROINTESTINAL NEGATIVE: 1
DIARRHEA: 0
APNEA: 0
EYES NEGATIVE: 1
SHORTNESS OF BREATH: 1
ALLERGIC/IMMUNOLOGIC NEGATIVE: 1
ABDOMINAL PAIN: 0
EYES NEGATIVE: 1
GASTROINTESTINAL NEGATIVE: 1
VOMITING: 0
EYE REDNESS: 0
SHORTNESS OF BREATH: 0
ALLERGIC/IMMUNOLOGIC NEGATIVE: 1
COUGH: 0
WHEEZING: 0
GASTROINTESTINAL NEGATIVE: 1
BLOOD IN STOOL: 0
RESPIRATORY NEGATIVE: 1
STRIDOR: 0
CHEST TIGHTNESS: 0
RESPIRATORY NEGATIVE: 1
ALLERGIC/IMMUNOLOGIC NEGATIVE: 1
EYE DISCHARGE: 0
SORE THROAT: 0

## 2021-01-01 ASSESSMENT — PAIN SCALES - GENERAL
PAINLEVEL_OUTOF10: 3
PAINLEVEL_OUTOF10: 3
PAINLEVEL_OUTOF10: 0
PAINLEVEL_OUTOF10: 0

## 2021-02-25 NOTE — PROGRESS NOTES
49 yo wf with weakness and slurring of speech  . She reports initial weakness of left hand with muscle atrophy loosing muscle mass one year ago . There was concern this was from carpal tunnel having EMG done in July by PMR at [facility] by Dr Cesario Moreau with report showing concern more of myelopathy . She reports ongoing left had weakness dropping things with decrease finger dexterity with numbness of index finger . There has been development of weakness of right hand over the last 6 months although not as bad with some trouble with finger dexterity . She reports that her legs are weak feeling like hey have 50 lbs on them being harder to walk . There is limp in left leg with some dragging of leg. There has been change in her voice since March with slurring . There is trouble swallowing more liquids with initiating swallow. There are mild headaches over bifrontal head twice per week . There is neck pain going into shoulder grade 3 over 10  . History reviewed. No pertinent past medical history.     Past Surgical History:   Procedure Laterality Date     SECTION      x2       Family History   Problem Relation Age of Onset    High Blood Pressure Mother     Hypertension Mother     Cancer Father         throat cancer       Social History     Socioeconomic History    Marital status: Single     Spouse name: None    Number of children: None    Years of education: None    Highest education level: None   Occupational History    None   Social Needs    Financial resource strain: Not hard at all   [place] insecurity     Worry: Never true     Inability: Never true    Transportation needs     Medical: No     Non-medical: No   Tobacco Use    Smoking status: Never Smoker    Smokeless tobacco: Never Used   Substance and Sexual Activity    Alcohol use: Yes     Comment: occ    Drug use: No    Sexual activity: Not Currently     Partners: Male   Lifestyle    Physical activity     Days per week: None     Minutes per Muscle strength deltoids 4+/5 , biceps4+/5 , WE 4+/5 . Iliopsoas 4-/5 , hastrings 4/5 , ADF and AE 4-/5 otherwise 5/5 strength                                                                              No dysmetria or dysdiadokinesis  No tremor   Decrease fine motor bilaterally  Gait right steppage gait  Orientation Alert and oriented x 3   Attention and concentration normal  Short term memory normal  Language process and speech normal . No aphasia   Cranial nerve 2 normal acuety and visual fields  Cranial nerve 3, 4 and 6 . Extraocular muscles are intact . Pupils are equal and reactive   Cranial nerve 5 .. Intact corneal reflex. Normal facial sensation  Cranial nerve 7 normal exam   Cranial nerve 8. Grossly intact hearing   Cranial nerve 9 and 10. Symmetric palate elevation   Cranial nerve 11 , 5 out of 5 strength   Cranial Nerve 12 midline tongue . Atrophy with fasciculations  Sensation . Normal pinprick and light touch   Deep Tendon Reflexes brisk  Plantar response equivocal bilaterally      ASSESSMENT/PLAN      Diagnosis Orders   1. Generalized weakness  MRI BRAIN WO CONTRAST    MRI CERVICAL SPINE WO CONTRAST   2.  Dysphagia, unspecified type  FL MODIFIED BARIUM SWALLOW W VIDEO   3. ALS (amyotrophic lateral sclerosis) ([facility])  MRI BRAIN WO CONTRAST    MRI CERVICAL SPINE WO CONTRAST    EMG    Vitamin B12    Sedimentation rate, automated    Folate    Hemoglobin A1C    TSH without Reflex    ERVIN   Patient has generalized weakness with bilateral hand and tongue atrophy with dysarthria and dysphagia suggestive of ALS to undergo 3 limb EMG and MRI of Head and cervical spine      Orders Placed This Encounter   Procedures    MRI BRAIN WO CONTRAST     Standing Status:   Future     Standing Expiration Date:   2/25/2022    MRI CERVICAL SPINE WO CONTRAST     Standing Status:   Future     Standing Expiration Date:   2/25/2022    FL MODIFIED BARIUM SWALLOW W VIDEO     Standing Status:   Future Standing Expiration Date:   2/25/2022    Vitamin B12     Standing Status:   Future     Standing Expiration Date:   2/25/2022    Sedimentation rate, automated     Standing Status:   Future     Standing Expiration Date:   2/25/2022    Folate     Standing Status:   Future     Standing Expiration Date:   2/25/2022    Hemoglobin A1C     Standing Status:   Future     Standing Expiration Date:   2/25/2022    TSH without Reflex     Standing Status:   Future     Standing Expiration Date:   2/25/2022    ERVIN     Standing Status:   Future     Standing Expiration Date:   2/25/2022    EMG     Standing Status:   Future     Standing Expiration Date:   4/26/2021     Order Specific Question:   Which body part?      Answer:   3 limb EMG

## 2021-02-26 NOTE — PROGRESS NOTES
100 mm 49 m/s       Interpretation: Bilateral sural antidromic sensory responses showed normal peak latencies amplitudes and sensory nerve conduction velocities. Bilateral peroneal, tibial, right ulnar motor study showed normal DML, low CMAP amplitudes and borderline normal motor nerve conduction velocities. F-wave latency for left peroneal and right ulnar nerves were normal.  Monopolar EMG study of the selected muscles revealed evidence of active denervation and chronic reinnervation in all the selected muscles. Needle EMG Examination:     Insertional Spontaneous Activity Volitional MUAPs Max Volitional Activity   Muscle Insertional Fibs +Wave Fasc Duration Amplitude Poly Config Recruitment Amplitude Pattern Effort   Tibialis anterior. L Increased +2 +2 +2 Prolonged Prolonged Moderate Normal FFU Normal Full Max. Gastrocnemius (Medial head). L Increased +3 +3 +2 Prolonged Prolonged Moderate Normal FFU Normal Full Max. Vastus medialis. L Increased +3 +2 +2 Prolonged Normal Mild Normal FFU Normal Full Max. Iliopsoas. L Increased +2 +2 +2 Prolonged Prolonged Moderate Normal FFU Normal Full Max. Glut. med. L Increased +2 +1 +2 Prolonged Prolonged Mild Normal Decreased Normal Full Max. L4 paraspinal.L Increased +1 +1 +1           L5 paraspinal.L Normal None None None           S1 paraspinal.L Normal None None None                Insertional Spontaneous Activity Volitional MUAPs Max Volitional Activity   Muscle Insertional Fibs +Wave Fasc Duration Amplitude Poly Config Recruitment Amplitude Pattern Effort   Tibialis anterior. R Increased +1 +1 +2 Prolonged Prolonged Mild Normal Decreased Normal Full Max. Gastrocnemius (Medial head). R Increased +1 +1 +2 Prolonged Prolonged Mild Normal Decreased Normal Full Max. Vastus medialis. R Increased +2 +1 +2 Prolonged Prolonged Mild Normal Decreased Normal Full Max. Iliopsoas. R Increased +1 +1 +2 Prolonged Prolonged Mild Normal Decreased Normal Full Max. Glut. med. R Increased +1 +1 +2 Prolonged Prolonged Mild Normal Decreased Normal Full Max. L4 paraspinal.R Increased None None None           L5 paraspinal.R       Increased None None None           S1 paraspinal.R Normal None None None              Insertional Spontaneous Activity Volitional MUAPs Max Volitional Activity   Muscle Insertional Fibs +Wave Fasc Duration Amplitude Poly Config Recruitment Amplitude Pattern Effort   1st dorsal interosseous. R Increased + + None Normal Normal None Normal Normal Normal Full Max. Pronator teres. R Normal None None None Normal Normal None Normal Normal Normal Full Max. Triceps brachii. R Increased + +2 +1 Prolonged Normal None Normal Normal Normal Full Max. Deltoid. R Normal None None None Normal Normal None Normal Normal Normal Full Max. Biceps. R Normal None None None Normal Normal None Normal Normal Normal Full Max. Thoracic paraspinal mid right Increased +1 +1 +1           Thoracic paraspinal mid left Increased +1 +1 +1           Thoracic paraspinal upper right Increased +1 +1 +1           Thoracic paraspinal upper left Increased +1 +1 +1               Conclusion: This is an abnormal electrophysiological study, consistent with an active generalized disorder of the motor neurons, their axons, or both. In the correct clinical setting, possibility of a motor neuron disease may be considered in addition to other differential diagnosis.       __________________________________  Jackson Isidro MD  Diplomat American Board of Psychiatry and Neurology  [address] [facility]

## 2021-03-05 NOTE — PROGRESS NOTES
Active problem Patient has generalized weakness with bilateral hand and tongue atrophy with dysarthria and dysphagia suggestive of ALS to undergo 3 limb EMG and MRI of Head and cervical spine . Estefanía Turner The condition is EMG/NCV shows active and chronic reinnervation in all muscles selected in bilateral legs and right arm. MRI of Head and cervical spine are sheduled for thiscoming week . She reports initial weakness of left hand with muscle atrophy loosing muscle mass one year ago . She reports ongoing left had weakness dropping things with decrease finger dexterity with numbness of index finger . There has been development of weakness of right hand over the last 6 months although not as bad with some trouble with finger dexterity . She reports that her legs are weak feeling like hey have 50 lbs on them being harder to walk . There is limp in left leg with some dragging of leg. There has been change in her voice since March with slurring . There is trouble swallowing more liquids with initiating swallow. There are mild headaches over bifrontal head twice per week . There is neck pain going into shoulder grade 3 over 10  . Testing B12 754. TSG 5.11(0.3-5). EMG/NCV with active and chronic reinnervation in all muscles selected in bilateral legs and right arm      History reviewed. No pertinent past medical history.     Past Surgical History:   Procedure Laterality Date     SECTION      x2       Family History   Problem Relation Age of Onset    High Blood Pressure Mother     Hypertension Mother     Cancer Father         throat cancer       Social History     Socioeconomic History    Marital status: Single     Spouse name: None    Number of children: None    Years of education: None    Highest education level: None   Occupational History    None   Social Needs    Financial resource strain: Not hard at all   [place] insecurity     Worry: Never true     Inability: Never true   [employer] needs     Medical: No Non-medical: No   Tobacco Use    Smoking status: Never Smoker    Smokeless tobacco: Never Used   Substance and Sexual Activity    Alcohol use: Yes     Comment: occ    Drug use: No    Sexual activity: Not Currently     Partners: Male   Lifestyle    Physical activity     Days per week: None     Minutes per session: None    Stress: None   Relationships    Social connections     Talks on phone: None     Gets together: None     Attends Hoahaoism service: None     Active member of club or organization: None     Attends meetings of clubs or organizations: None     Relationship status: None    Intimate partner violence     Fear of current or ex partner: None     Emotionally abused: None     Physically abused: None     Forced sexual activity: None   Other Topics Concern    None   Social History Narrative    None       Current Outpatient Medications   Medication Sig Dispense Refill    omeprazole (PRILOSEC) 20 MG delayed release capsule Take 20 mg by mouth daily      Multiple Vitamins-Minerals (MULTIVITAMIN ADULT PO) Take by mouth      Cholecalciferol (VITAMIN D) 50 MCG (2000 UT) TABS tablet Take 2,000 Units by mouth daily      ferrous sulfate 325 (65 Fe) MG tablet Take 325 mg by mouth daily (with breakfast)      riluzole (RILUTEK) 50 MG tablet Take 1 tablet by mouth every 12 hours 60 tablet 5     No current facility-administered medications for this visit. No Known Allergies      Review of Systems     Vitals:    03/05/21 1112   BP: 109/71   Pulse: 91   Temp: 97.7 °F (36.5 °C)     weight: 120 lb (54.4 kg)      Review of Systems   Constitutional: Negative. HENT: Negative. Eyes: Negative. Respiratory: Negative. Cardiovascular: Negative. Gastrointestinal: Negative. Endocrine: Negative. Genitourinary: Negative. Musculoskeletal: Negative. Skin: Negative. Allergic/Immunologic: Negative. Neurological: Negative. Hematological: Negative. Psychiatric/Behavioral: Negative. Neurological Examination  Constitutional .General exam well groomed   Head/Ears /Nose/Throat: external ear . Normal exam  Neck and thyroid . Normal size. No bruits  Respiratory . Breathsounds clear bilaterally  Cardiovascular: Auscultation of heart with regular rate and rhythm  Musculoskeletal. Muscle atrophy dorsal interosseous muscles bilaterally                                                            Muscle strength deltoids 4+/5 , biceps4+/5 , WE 4+/5 . Iliopsoas 4-/5 , hastrings 4/5 , ADF and AE 4-/5 otherwise 5/5 strength                                                                              No dysmetria or dysdiadokinesis  No tremor   Decrease fine motor bilaterally  Gait right steppage gait  Orientation Alert and oriented x 3   Attention and concentration normal  Short term memory normal  Language process and speech normal . No aphasia   Cranial nerve 2 normal acuety and visual fields  Cranial nerve 3, 4 and 6 . Extraocular muscles are intact . Pupils are equal and reactive   Cranial nerve 5 .. Intact corneal reflex. Normal facial sensation  Cranial nerve 7 normal exam   Cranial nerve 8. Grossly intact hearing   Cranial nerve 9 and 10. Symmetric palate elevation   Cranial nerve 11 , 5 out of 5 strength   Cranial Nerve 12 midline tongue . Atrophy with fasciculations  Sensation . Normal pinprick and light touch   Deep Tendon Reflexes brisk  Plantar response equivocal bilaterally      ASSESSMENT/PLAN      Diagnosis Orders   1. ALS (amyotrophic lateral sclerosis) [facility])  External Referral To Neurology   2. Generalized weakness     3.  Dysphagia, unspecified type     Patient is felt to have ALS confirmed on EMG to start rilutek and proceed with second opinion at [address] [facility] clinic at [facility] along with  MRI of Head and cervical spine with swallow study as scheduled     Orders Placed This Encounter   Procedures    External Referral To Neurology     Referral Priority:   Routine     Referral Type: Eval and Treat     Referral Reason:   Specialty Services Required     Requested Specialty:   Neurology     Number of Visits Requested:   1

## 2021-03-11 NOTE — TELEPHONE ENCOUNTER
I spoke with Laverne Pemberton and gave her the message. Referral placed and a call was placed to [facility].

## 2021-03-16 NOTE — PROCEDURES
INSTRUMENTAL SWALLOW REPORT  MODIFIED BARIUM SWALLOW    NAME: Maria Eugenia Cabrera   : 1970  MRN: [id number]       Date of Eval: 3/16/2021     Ordering Physician: Dr. Gregorio Schmidt  Radiologist: Dr. Leny Schmitt    Past Medical History:  has no past medical history on file. Past Surgical History:  has a past surgical history that includes  section. Current Diet Solid Consistency: Regular  Current Diet Liquid Consistency: Thin       Type of Study: Initial MBS      Patient Complaints/Reason for Referral:  Maria Eugenia Cabrera was referred for a MBS to assess the efficiency of her swallow function, assess for aspiration, and to make recommendations regarding safe dietary consistencies, effective compensatory strategies, and safe eating environment. Patient complaints: Pt. reports difficulty swallowing for approx. 4 months. Reports coughing when eating. Behavior/Cognition/Vision/Hearing:  Behavior/Cognition: Alert; Cooperative  Vision: Within Functional Limits  Hearing: Within functional limits    Impressions:  Pt. with + penetration, + aspiration without cough with thin liquid. Min vallecula residual noted, min residual noted in laryngeal vestibule. Pt. with + penetration, no aspiration with nectar thick liquid. Min vallecula residual noted. + min trace penetration, no aspiration with honey thick liquid. No penetration, no aspiration with puree, soft and regular solid. Mod vallecula residual and min pyriform residual noted with puree. Decreased with subsequent swallows. Min vallecula residual noted with soft solids. Moderate vallecula and min pyriform residual noted with regular solids. Mild extended mastication noted with solids. Premature spill noted with liquid, puree, soft and regular solids. After inital puree bolus, pt. requird liquid wash before additional trials given. ST recommends dental soft diet with honey thick liquid. Pt. Provided with education re: safe swallow strategies.   Pt. Verbalized understanding. Patient Degrees: upright in chair  Consistencies Administered: Dysphagia Soft and Bite-Sized (Dysphagia III); Reg solid; Dysphagia Pureed (Dysphagia I); Honey teaspoon; Thin cup;Nectar cup    Recommended Diet:  Solid consistency: Dental Soft  Liquid consistency: Moderately Thick (Honey)    Safe Swallow Protocol:  Compensatory Swallowing Strategies: Upright as possible for all oral intake;Eat/Feed slowly; Small bites/sips    Recommendations/Treatment  Requires SLP Intervention: yes--outpatient  D/C Recommendations: Outpatient  Referral To: Speech Evaluation for swallowing    Education: Images and recommendations were reviewed with pt. following this exam.   Patient Education: yes  Patient Education Response: Verbalizes understanding    Prognosis  Prognosis for safe diet advancement: fair      Goals:    Long Term: To Maximize safety with intake, optimize nutrition/hydration and minimize risk for aspiration. Oral Preparation / Oral Phase  Oral Phase: Impaired   Oral Phase: Decreased A-P transit with puree. Mild extended mastication noted with solids. Premature spill noted with liquid, puree, soft and regular solids. After inital puree bolus, pt. requird liquid wash before additional trials given. Pharyngeal Phase  Pharyngeal Phase: Impaired   Pharyngeal: Pt. with + penetration, + aspiration without cough with thin liquid. Min vallecula residual noted, min laryngeal vestibule residual noted. Pt. with + penetration, no aspiration with nectar thick liquid. Min vallecula residual noted. + min trace penetration, no aspiration with honey thick liquid. No penetration, no aspiration with puree, soft and regular solid. Mod vallecula residual and min pyriform residual noted with puree. Decreased with subsequent swallows. Min vallecula residual noted with soft solids. Moderate vallecula and min pyriform residual noted with regular solids.     Esophageal Phase  Esophageal Screen: [place]    Pain Patient Currently in Pain: No         Therapy Time:   Individual Concurrent Group Co-treatment   Time In 0940         Time Out 0950         Minutes 10                   GILBERTO GARRISON M.A.  [facility] 3/16/2021, 1:33 PM

## 2021-03-24 NOTE — PROGRESS NOTES
Department of Neurosurgery                                                 New patient clinic note      Reason for Consult:  Cervical myelopathy  Requesting Physician:  rossana  Neurosurgeon: Jose Duvall DO      History Obtained From:  patient    CHIEF COMPLAINT:         Chief Complaint   Patient presents with    New Patient     Cervical disc herniation and Cervical spinal cord compression       HISTORY OF PRESENT ILLNESS:       The patient is a 48 y.o. female who presents for consultation for cervical myelopathy. She was seen by Dr. Carol wood initially for a combination of symptoms and he referred her to me. For over a year she has back progressive hand weakness, leg weakness, poor balance, and progressive dysarthria and more recently trouble with swallowing. She has no shooting pain down her arms. The symptoms are interfering with her life and her ability to take care of her kids    PAST MEDICAL HISTORY :       Past Medical History:    No past medical history on file.     Past Surgical History:        Procedure Laterality Date     SECTION      x2       Social History:   Social History     Socioeconomic History    Marital status: Single     Spouse name: Not on file    Number of children: Not on file    Years of education: Not on file    Highest education level: Not on file   Occupational History    Not on file   Social Needs    Financial resource strain: Not hard at all   [place] insecurity     Worry: Never true     Inability: Never true   [employer] needs     Medical: No     Non-medical: No   Tobacco Use    Smoking status: Never Smoker    Smokeless tobacco: Never Used   Substance and Sexual Activity    Alcohol use: Yes     Comment: occ    Drug use: No    Sexual activity: Not Currently     Partners: Male   Lifestyle    Physical activity     Days per week: Not on file     Minutes per session: Not on file    Stress: Not on file   Relationships    Social connections Talks on phone: Not on file     Gets together: Not on file     Attends Yazidism service: Not on file     Active member of club or organization: Not on file     Attends meetings of clubs or organizations: Not on file     Relationship status: Not on file    Intimate partner violence     Fear of current or ex partner: Not on file     Emotionally abused: Not on file     Physically abused: Not on file     Forced sexual activity: Not on file   Other Topics Concern    Not on file   Social History Narrative    Not on file       Family History:       Problem Relation Age of Onset    High Blood Pressure Mother     Hypertension Mother     Cancer Father         throat cancer       Allergies:  Patient has no known allergies. Home Medications:  Prior to Admission medications    Medication Sig Start Date End Date Taking? Authorizing Provider   riluzole (RILUTEK) 50 MG tablet Take 1 tablet by mouth every 12 hours 3/5/21 4/4/21 Yes Olayinka Rudd MD   omeprazole (PRILOSEC) 20 MG delayed release capsule Take 20 mg by mouth daily   Yes Historical Provider, MD   Multiple Vitamins-Minerals (MULTIVITAMIN ADULT PO) Take by mouth   Yes Historical Provider, MD   Cholecalciferol (VITAMIN D) 50 MCG (2000 UT) TABS tablet Take 2,000 Units by mouth daily   Yes Historical Provider, MD   ferrous sulfate 325 (65 Fe) MG tablet Take 325 mg by mouth daily (with breakfast)   Yes Historical Provider, MD       Current Medications:   No current facility-administered medications for this visit.      REVIEW OF SYSTEMS:       CONSTITUTIONAL: negative for fatigue and malaise   EYES: negative for double vision and photophobia    HEENT: negative for tinnitus and sore throat   RESPIRATORY: negative for cough, shortness of breath   CARDIOVASCULAR: negative for chest pain, palpitations   GASTROINTESTINAL: negative for nausea, vomiting   GENITOURINARY: negative for incontinence   MUSCULOSKELETAL: negative for neck or back pain   NEUROLOGICAL: negative for seizures   PSYCHIATRIC: negative for agitated     Review of systems otherwise negative. PHYSICAL EXAM:       /64 (Site: Right Upper Arm, Position: Sitting, Cuff Size: Medium Adult)   Pulse 72   Wt 120 lb (54.4 kg) Comment: LKW  BMI 22.67 kg/m²     Gen: NAD, comfortable  HEENT: moist mucus membranes  Cardio: RRR  Pulm: chest rise symmetrically  GI: abd soft  Ext: no edema  Skin: warm    Neuro:    AOX3  Sensation symmetrical   Symmetric  DTR symmetrical  Bilateral Misael sign  Bilateral hand atrophy intrinsic hand muscles  Bilateral dorsi flexion weakness 4/5.  right worsened left. Bilateral plantar flexion weakness  Mild buccal dysarthria  Atrophy of her tongue with fasciculation    LABS AND IMAGING:     CBC with Differential:    Lab Results   Component Value Date    WBC 5.6 10/26/2020    RBC 5.36 10/26/2020    HGB 12.3 10/26/2020    HCT 41.5 10/26/2020     10/26/2020    MCV 77.4 10/26/2020    MCH 22.9 10/26/2020    MCHC 29.6 10/26/2020    RDW 17.6 10/26/2020    LYMPHOPCT 46 10/26/2020    MONOPCT 7 10/26/2020    BASOPCT 1 10/26/2020    MONOSABS 0.40 10/26/2020    LYMPHSABS 2.55 10/26/2020    EOSABS 0.17 10/26/2020    BASOSABS 0.06 10/26/2020    DIFFTYPE NOT REPORTED 10/26/2020     BMP:    Lab Results   Component Value Date     10/26/2020    K 4.1 10/26/2020     10/26/2020    CO2 26 10/26/2020    BUN 12 10/26/2020    LABALBU 4.0 10/26/2020    CREATININE 0.55 10/26/2020    CALCIUM 9.4 10/26/2020    GFRAA >60 10/26/2020    LABGLOM >60 10/26/2020    GLUCOSE 98 10/26/2020       Radiology Review: Cervical stenosis with cord compression C4-5 due mostly anterior disc osteophyte      ASSESSMENT AND PLAN:     There is no problem list on file for this patient.         A/P:  This is a 48 y.o. female with a diagnosis of ALS and concomitant cervical stenosis with cord compression at C4-5  She is seeking opinion at [facility] clinic regarding her illness  At the meantime she has significant cervical stenosis and myelopathy which is treatable surgically. I discussed with her the benefits, the risk which include postoperative dysphagia which may worsen her current dysphagia, and the failure to improve given her's advanced lower motor neuron signs from the ALS  She does want to proceed with surgery however and will proceed with ACDF C4-5        Ap Carballo DO     3/17/2021  5:15 PM    Jermain Dunn DO  Staff Neurosurgeon    This note was created using voice recognition software. There may be inaccuracies of transcription  that are inadvertently overlooked prior to the signature. There is any questions about the transcription please contact me.

## 2021-04-01 NOTE — PROGRESS NOTES
Anesthesia Focused Assessment    STOP-BANG Sleep Apnea Questionnaire    SNORE loudly (heard through closed doors)? No  TIRED, fatigued, sleepy during daytime? No  OBSERVED stopping breathing during sleep? No  High blood PRESSURE being treated? No    BMI over 35? No  AGE over 48? Yes  NECK circumference over 16\"? No  GENDER (male)? No             Total 1  High risk 5-8  Intermediate risk 3-4  Low risk 0-2    Obstructive Sleep Apnea: no  If YES, machine used: no    Type 1 DM:   no  T2DM:  no    Coronary Artery Disease:  no  Hypertension:  no    Active smoker:  no  Drinks Alcohol:  no    Dentition: WDL    Defib / AICD / Pacemaker: no      Renal Failure/dialysis:  no    Patient was evaluated in PAT & anesthesia guidelines were applied. NPO guidelines, medication instructions and scheduled arrival time were reviewed with patient.     Hx of anesthesia complications:  no  Family hx of anesthesia complications:  no                                                                                                                     Anesthesia contacted:   no  Medical or cardiac clearance ordered: yes    JAGJIT De Jesus  4/1/21  10:34 AM

## 2021-04-01 NOTE — H&P
MUSCULOSKELETAL:  stiff joints and decreased range of motion  NEUROLOGICAL:  coordination problems and gait problems  BEHAVIOR/PSYCH:  negative     OBJECTIVE:     VITALS:  height is 5' 1\" (1.549 m) and weight is 112 lb (50.8 kg). Her temporal temperature is 97 °F (36.1 °C). Her blood pressure is 119/74 and her pulse is 113. Her respiration is 18 and oxygen saturation is 98%. CONSTITUTIONAL: Alert & oriented x 3, no acute distress. SKIN:  Warm and dry, no rash or erythema. HEAD:  Normocephalic, atraumatic. EYES: PERRLA. EOMs intact. EARS:  Hearing grossly normal.    NOSE:  Nares patent. Septum midline. No rhinorrhea   MOUTH/THROAT:  Unremarkable   NECK: Supple with no lymphadenopathy. LUNGS: Clear to auscultation throughout. No wheezes, rales or rhonchi. CARDIOVASCULAR: Heart rate regular. Rhythm without murmur, click, gallop or rub. ABDOMEN: Soft, non tender, non distended, no masses or organomegaly. EXTREMITIES: No clubbing, cyanosis or edema. Spacticity of upper extremities. TESTING:     EK21  Labs pending: drawn 2021     IMPRESSION:   1. Cord compression  2.  has a past medical history of ALS (amyotrophic lateral sclerosis) ([address] and Well adult health check. PLAN:   1.  Anterior C-4 partial corpectomy, C3-5 fusion, possible corpectomy with cage    Renford Nails JAGJIT FOY  Electronically signed 2021 at 10:58 AM

## 2021-04-13 NOTE — TELEPHONE ENCOUNTER
----- Message from Veronica Carlisle MD sent at 4/6/2021  9:41 AM EDT -----  Nino Pittman please let her know she passed swallow study.  Make sure she has FU with Sunil Yi [address] [place] [address]

## 2021-05-24 NOTE — H&P
[facility]    Consult / H&P               Today's Date: 2021  Patient Name: Candy Nettles  Date of admission: No admission date for patient encounter. Patient's age: 46 y.o., 1970  Admission Dx: No admission diagnoses are documented for this encounter. Reason for Admission / Consult: Abnormal stress test    Requesting Physician: No admitting provider for patient encounter. CHIEF COMPLAINT: Atypical chest pain    History Obtained From:  patient, electronic medical record    HISTORY OF PRESENT ILLNESS:      The patient is a 46 y. o.female who presents for elective cardiac cath. Patient was seen in April and reported recurrent episodes of chest discomfort and shortness of breath on exertion which prompted ED visits. She also reported episodes of chest pain and shortness of breath which occurred at rest.  EKG showed T wave inversions in inferolateral leads. Stress test was ordered at that visit which showed moderate mid and apical anterior ischemia. Patient was subsequently scheduled for cardiac cath. Past Medical History:   has a past medical history of ALS (amyotrophic lateral sclerosis) ([facility] [address] and Well adult health check. Past Surgical History:   has a past surgical history that includes  section. Home Medications:    Prior to Admission medications    Medication Sig Start Date End Date Taking? Authorizing Provider   omeprazole (PRILOSEC) 20 MG delayed release capsule Take 20 mg by mouth daily    Historical Provider, MD   Multiple Vitamins-Minerals (MULTIVITAMIN ADULT PO) Take by mouth    Historical Provider, MD   Cholecalciferol (VITAMIN D) 50 MCG (2000) TABS tablet Take 2,000 Units by mouth daily    Historical Provider, MD   ferrous sulfate 325 (65 Fe) MG tablet Take 325 mg by mouth every other day     Historical Provider, MD        No current facility-administered medications for this encounter. Allergies:  Patient has no known allergies.     Social History:   reports that she has never smoked. She has never used smokeless tobacco. She reports previous alcohol use. She reports that she does not use drugs. Family History: family history includes Cancer in her father; High Blood Pressure in her mother; Hypertension in her mother. No h/o sudden cardiac death. No for premature CAD    REVIEW OF SYSTEMS:    · Constitutional: there has been no unanticipated weight loss. There's been No change in energy level, No change in activity level. · Eyes: No visual changes or diplopia. No scleral icterus. · ENT: No Headaches  · Cardiovascular: Remaining as above  · Respiratory: No previous pulmonary problems, No cough  · Gastrointestinal: No abdominal pain. No change in bowel or bladder habits. · Genitourinary: No dysuria, trouble voiding, or hematuria. · Musculoskeletal:  No gait disturbance, No weakness or joint complaints. · Integumentary: No rash or pruritis. · Neurological: No headache, diplopia, change in muscle strength, numbness or tingling. No change in gait, balance, coordination, mood, affect, memory, mentation, behavior. · Psychiatric: No anxiety, or depression. · Endocrine: No temperature intolerance. No excessive thirst, fluid intake, or urination. No tremor. · Hematologic/Lymphatic: No abnormal bruising or bleeding, blood clots or swollen lymph nodes. · Allergic/Immunologic: No nasal congestion or hives. PHYSICAL EXAM:      There were no vitals taken for this visit. No intake or output data in the 24 hours ending 05/24/21 0920      Constitutional and General Appearance: alert, cooperative, no distress and appears stated age  [de-identified]: PERRL, no cervical lymphadenopathy. No masses palpable. Normal oral mucosa  Respiratory:  · Normal excursion and expansion without use of accessory muscles  · Resp Auscultation: Good respiratory effort. No for increased work of breathing.  On auscultation: clear to auscultation bilaterally  Cardiovascular:  · The AST, ALT, LABALBU in the last 72 hours. Patient's Active Problem List  Active Problems:    * No active hospital problems. *  Resolved Problems:    * No resolved hospital problems. *        IMPRESSION:    1. Atypical chest pain with abnormal stress test: Moderate and anterior mid and apical ischemia  2. Normal ECG: Anterolateral T wave inversions  3. GERD    RECOMMENDATIONS:  1. Proceed with cardiac cath  2. Further recommendations post procedure    Pre Procedure Conscious Sedation Data:     ASA Class:                  [] I [] II [x] III [] IV     Mallampati Class:       [] I [] II [x] III [] IV      Risk, benefits and alternatives of cardiac catheterization were discussed in detail. Risk of bleeding, requiring blood transfusion, vascular complication requiring surgery, renal insufficieny with need of dialysis, CVA, MI, death and anesthesia complications including intubation were discussed. Patient agrees to proceed and verbalizes understanding. Discussed with patient and Nurse. Evelio Hernadez MD, M.D. Fellow, [address]        Attestation signed by      Attending Physician Statement:    I have discussed the care of  [address] , including pertinent history and exam findings, with the Cardiology fellow/resident. I have seen and examined the patient and the key elements of all parts of the encounter have been performed by me. I agree with the assessment, plan and orders as documented by the fellow/resident, after I modified exam findings and plan of treatments, and the final version is my approved version of the assessment.      Additional Comments:

## 2021-05-24 NOTE — PROGRESS NOTES
All discharge instructions reviewed, questions answered, paper signed and given copy. Patient discharged per wheelchair with sister and belongings.

## 2021-05-24 NOTE — OP NOTE
[facility]        Date:   5/24/2021  Patient name:  Will Becerra  Date of admission:  5/24/2021 10:42 AM  MRN:   [id number]  YOB: 1970    CARDIAC CATHETERIZATION    Operators:  Chriss Heimlich, MD    CV Fellow: Lyndsey Arreguin M.D. Procedure performed:       [x] Left Heart Catheterization. [] Graft Angiography. [x] Left Ventriculography. [] Right Heart Catheterization. [x] Coronary Angiography. [] Aortic Valve Studies. [] PCI:      [] Other:       Pre Procedure Conscious Sedation Data:    ASA Class:    [] I [] II [x] III [] IV    Mallampati Class:  [] I [] II [x] III [] IV      Indication:  [] STEMI      [x] + Stress test  [] ACS      [] + EKG Changes  [] Non Q MI       [] Significant Risk Factors  [] Recurrent Angina             [] Diabetes Mellitus    [] New LBBB      [] Uncontrolled HTN. [] CHF / Low EF changes     [] Abnormal CTA / Ca Score  [] Other:     Procedure:  Access:  [] Femoral artery  [x] Radial  artery       [x] Right   [] Left    Procedure: After informed consent was obtained with explanation of the risks and benefits, patient was brought to the cath lab. The access area was prepped and draped in sterile fashion. 1% lidocaine was used for local block. The artery was cannulated with 6  Fr sheath with brisk arterial blood return. The side port was frequently flushed and aspirated with normal saline. Estimated blood loss: 10 ml    Findings:      LMCA: Normal 0% stenosis. LAD: Normal 0% stenosis. LCx: Normal 0% stenosis. RCA: Normal 0% stenosis.       Coronary Tree      Dominance: Right     LV Analysis  LV function assessed as:Normal.  Ejection Fraction:55%            Conclusions:      Normal coronary arteries   Normal LV function     Recommendations:   Medical treatments   Clear for planned surgery with low risk      History and Risk Factors    [] Hypertension     [] Family history of CAD  [] Hyperlipidemia     [] Cerebrovascular arrangement based on my evaluation and exam.    Risk and benefit of procedure planned were explained in details. Procedure was performed by me personally, with all aspect of the procedure being done using standard protocol. Note was modified based on my own assessment and treatment.     Jeancarlos Cadet MD  [facility] [[email or web address]] : last blood work d/w the pt at length

## 2021-05-24 NOTE — PROGRESS NOTES
Patient received post cath to room 10. Assessment obtained. Post cath pathway initiated. Right radial site with Vasc Band intact ? 10 cc air. No hematoma noted. Restrictions reviewed with patient and family. Patient without complaints. Side rails up 2 of 2 with call light to reach.   Fluids and light meal offered

## 2021-06-01 NOTE — TELEPHONE ENCOUNTER
[facility] Provider Statement paperwork was received. Blank copy of form has been scanned.

## 2021-06-19 PROBLEM — R77.8 ELEVATED TROPONIN: Status: ACTIVE | Noted: 2021-01-01

## 2021-06-19 NOTE — ED PROVIDER NOTES
[facility] [address] [facility]  Emergency Department  Emergency Medicine Resident Sign-out     Care of Florence Kelly was assumed from Dr. Rosalina Mcclure and is being seen for Dizziness  . The patient's initial evaluation and plan have been discussed with the prior provider who initially evaluated the patient. EMERGENCY DEPARTMENT COURSE / MEDICAL DECISION MAKING:       MEDICATIONS GIVEN:  Orders Placed This Encounter   Medications    iopamidol (ISOVUE-370) 76 % injection 90 mL    aspirin chewable tablet 324 mg    midazolam PF (VERSED) injection 2 mg       LABS / RADIOLOGY:     Labs Reviewed   CBC WITH AUTO DIFFERENTIAL - Abnormal; Notable for the following components:       Result Value    RBC 5.32 (*)     MCV 80.5 (*)     MCH 23.5 (*)     RDW 15.9 (*)     Seg Neutrophils 75 (*)     Lymphocytes 18 (*)     All other components within normal limits   BASIC METABOLIC PANEL W/ REFLEX TO MG FOR LOW K - Abnormal; Notable for the following components:    Glucose 121 (*)     CREATININE 0.44 (*)     Chloride 97 (*)     CO2 33 (*)     All other components within normal limits   TROPONIN - Abnormal; Notable for the following components:    Troponin, High Sensitivity 75 (*)     All other components within normal limits   TROPONIN - Abnormal; Notable for the following components:    Troponin, High Sensitivity 67 (*)     All other components within normal limits   URINE RT REFLEX TO CULTURE - Abnormal; Notable for the following components:    Turbidity UA CLOUDY (*)     Ketones, Urine SMALL (*)     Urine Hgb LARGE (*)     Nitrite, Urine POSITIVE (*)     All other components within normal limits   MICROSCOPIC URINALYSIS - Abnormal; Notable for the following components:    Bacteria, UA MANY (*)     All other components within normal limits   D-DIMER, QUANTITATIVE       XR CHEST PORTABLE    Result Date: 6/19/2021  EXAMINATION: ONE XRAY VIEW OF THE CHEST 6/19/2021 1:52 pm COMPARISON: Chest January 3, 2020.  HISTORY: ORDERING SYSTEM PROVIDED HISTORY: Chest pain TECHNOLOGIST PROVIDED HISTORY: Chest pain FINDINGS: The lungs are without acute focal process. There is no effusion or pneumothorax. The cardiomediastinal silhouette is without acute process. No acute process. CTA HEAD NECK W CONTRAST    Result Date: 6/19/2021  EXAMINATION: CTA OF THE HEAD AND NECK WITH CONTRAST 6/19/2021 2:16 pm: TECHNIQUE: CTA of the head and neck was performed with the administration of intravenous contrast. Multiplanar reformatted images are provided for review. MIP images are provided for review. Stenosis of the internal carotid arteries measured using NASCET criteria. Dose modulation, iterative reconstruction, and/or weight based adjustment of the mA/kV was utilized to reduce the radiation dose to as low as reasonably achievable. COMPARISON: None. HISTORY: ORDERING SYSTEM PROVIDED HISTORY: Concern for carotid dissection TECHNOLOGIST PROVIDED HISTORY: Concern for carotid dissection Decision Support Exception - unselect if not a suspected or confirmed emergency medical condition->Emergency Medical Condition (MA) Reason for Exam: Concern for carotid dissection FINDINGS: CTA NECK: AORTIC ARCH/ARCH VESSELS: No dissection or arterial injury. No significant stenosis of the brachiocephalic or subclavian arteries. CAROTID ARTERIES: No dissection, arterial injury, or hemodynamically significant stenosis by NASCET criteria. VERTEBRAL ARTERIES: No dissection, arterial injury, or significant stenosis. SOFT TISSUES: The lung apices are clear. No cervical or superior mediastinal lymphadenopathy. The larynx and pharynx are unremarkable. No acute abnormality of the salivary and thyroid glands. BONES: No acute osseous abnormality. CTA HEAD: ANTERIOR CIRCULATION: No significant stenosis of the intracranial internal carotid, anterior cerebral, or middle cerebral arteries. No aneurysm. There is a right-sided posterior communicating artery.  POSTERIOR CIRCULATION: No significant stenosis of the vertebral, basilar, or posterior cerebral arteries. No aneurysm. OTHER: No dural venous sinus thrombosis on this non-dedicated study. BRAIN: A noncontrast exam was not included as part of this exam limiting evaluation. No obvious mass or bleed is seen. There is no obvious evidence of an acute infarct. No flow limiting stenosis or large vessel occlusion visualized within the head or neck. RECENT VITALS:     Temp: 98.3 °F (36.8 °C),  Pulse: 94, Resp: 18, BP: 104/79, SpO2: 93 %    This patient is a 46 y.o. Female with history of ALS, presenting with a few days of vertigo symptoms when turning her head, intermittent chest pain, no chest pain in the emergency department. Patient has no focal deficits, exam benign, CTA head and neck found no acute abnormalities, consulted with neurology who recommended MRI and admission for further management. Patient work-up for chest pain, found to have normal EKG, elevated troponin, first troponin 75, follow-up troponin 67, consulted with cardiology who recommended holding heparin at this time, trending the troponin one more time, if the troponin rises, start heparin. Internal medicine declined admission, patient is admitted to [facility]. Pending transfer to floor. Admitted to floor, now boarding. Troponin continuing to trend downward. OUTSTANDING TASKS / RECOMMENDATIONS:    1. Follow-up admit order  2. Follow-up troponin     FINAL IMPRESSION:     1. Dizziness        DISPOSITION:         DISPOSITION:  []  Discharge   []  Transfer -    [x]  Admission -     []  Against Medical Advice   []  Eloped   FOLLOW-UP: No follow-up provider specified.    DISCHARGE MEDICATIONS: New Prescriptions    No medications on file           Daniel Briones MD  Emergency Medicine Resident  [facility]       Daniel Briones MD  Resident  06/19/21 [id number]

## 2021-06-19 NOTE — CONSULTS
Neurology Consult Note      Reason for Consult:  dizzines  Requesting Physician:  Dr. Jermaine Samano:  dizziness    History Obtained From:  patient, electronic medical record       HISTORY OF PRESENT ILLNESS:              The patient is a 46 y.o. female with significant past medical history of ALS who presents with dizziness/vertigo that has been ongoing for the last 6 months. She typically has vertiginous symptoms of rooms spinning and lightheadedness that lasts for 2-3 minutes before spontaneously resolving. She states that they are position dependent and occur around the beginning her menstrual cycle and in the morning. She has never been worked up for vertigo before. She reports this happened once per month, however today the episode lasted for over 2 hours, which is atypical for her. Currently she has not complaints and is not symptomatic. Follows with Dr. Jorge Louis for ALS, last seen 3/5/2021. Recent EMG 21 showing acitve and chronic reinervation in all muscles slected in chaparro legs and RUE. Recent difficulty swallowing liquids, assed swallow study. Referred to ALS clinic to [facility] per Dr. Jorge Louis. MRI Brain 3/1/21 WO neg  MRI C spine showing midline disc extrusion at C4-5 with severe canal stenosis with referral to Dr. Polly Wick.        Past Medical History:        Diagnosis Date    Abnormal cardiovascular stress test 2021    moderate mid and apical anterior ischemia    ALS (amyotrophic lateral sclerosis) ([address]     NEUROLOGIST - DR. Riley Blocker - LAST VISIT 3/2021 - [facility]FERR[facility] TO [facility]    GERD (gastroesophageal reflux disease)     Well adult health check     PCP DR [address] - 3/2021     Past Surgical History:        Procedure Laterality Date    CARDIAC CATHETERIZATION  2021     SECTION      x2     Current Medications:   Current Facility-Administered Medications: midazolam PF (VERSED) injection 2 mg, 2 mg, Intravenous, Once  Allergies:  Patient has no known allergies. Social History:  TOBACCO:   reports that she has never smoked. She has never used smokeless tobacco.  ETOH:   reports previous alcohol use. DRUGS:   reports no history of drug use. ACTIVITIES OF DAILY LIVING:    INSTRUMENTAL ACTIVITIES OF DAILY LIVING:  Patient is able to perform all instrumental activities of daily living. Family History:       Problem Relation Age of Onset    High Blood Pressure Mother     Hypertension Mother     Cancer Father         throat cancer       REVIEW OF SYSTEMS:  Review of Systems   Constitutional: Negative for activity change, appetite change, chills, diaphoresis, fatigue and fever. HENT: Negative for sore throat. Eyes: Negative for discharge and redness. Respiratory: Negative for apnea, cough, chest tightness, shortness of breath, wheezing and stridor. Cardiovascular: Negative for chest pain and leg swelling. Gastrointestinal: Negative for abdominal distention, abdominal pain, constipation, diarrhea, nausea and vomiting. Genitourinary: Negative for difficulty urinating, dysuria, flank pain, frequency, hematuria and urgency. Musculoskeletal: Negative for arthralgias. Neurological: Negative for dizziness, tremors, seizures, syncope, facial asymmetry, speech difficulty, weakness, light-headedness, numbness and headaches. Hematological: Negative for adenopathy. PHYSICAL EXAM:    Vitals:  /79   Pulse 94   Temp 98.3 °F (36.8 °C) (Oral)   Resp 18   LMP 06/16/2021   SpO2 93%        Physical Exam  Vitals and nursing note reviewed. Constitutional:       General: She is not in acute distress. Appearance: She is well-developed. She is not diaphoretic. HENT:      Head: Normocephalic and atraumatic. Right Ear: External ear normal.      Left Ear: External ear normal.   Eyes:      General: No scleral icterus. Right eye: No discharge. Left eye: No discharge.       Conjunctiva/sclera: Conjunctivae normal.      Pupils: Pupils are equal, round, and reactive to light. Pulmonary:      Effort: Pulmonary effort is normal.   Abdominal:      General: There is no distension. Palpations: Abdomen is soft. Tenderness: There is no abdominal tenderness. There is no guarding. Musculoskeletal:         General: No tenderness or deformity. Normal range of motion. Cervical back: Normal range of motion. Skin:     General: Skin is warm and dry. Capillary Refill: Capillary refill takes less than 2 seconds. Findings: No erythema or rash. Neurological:      General: No focal deficit present. Mental Status: She is alert and oriented to person, place, and time. GCS: GCS eye subscore is 4. GCS verbal subscore is 5. GCS motor subscore is 6. Cranial Nerves: Dysarthria present. No cranial nerve deficit or facial asymmetry. Sensory: No sensory deficit. Motor: Weakness (generalized weakenss) and atrophy present. No tremor, abnormal muscle tone or seizure activity. Coordination: Coordination is intact. Coordination normal.      Deep Tendon Reflexes: Reflexes are normal and symmetric. Reflexes normal.      Reflex Scores:       Tricep reflexes are 2+ on the right side and 2+ on the left side. Bicep reflexes are 2+ on the right side and 2+ on the left side. Brachioradialis reflexes are 2+ on the right side and 2+ on the left side. Patellar reflexes are 2+ on the right side and 2+ on the left side. Achilles reflexes are 2+ on the right side and 2+ on the left side.      Comments: fasiculations noted throughout   Psychiatric:         Behavior: Behavior normal.                DATA  Recent Results (from the past 24 hour(s))   EKG 12 Lead    Collection Time: 06/19/21  1:18 PM   Result Value Ref Range    Ventricular Rate 96 BPM    Atrial Rate 96 BPM    P-R Interval 122 ms    QRS Duration 52 ms    Q-T Interval 360 ms    QTc Calculation (Bazett) 454 ms    P Axis 75 degrees    R Axis 17 degrees    T Axis 23 degrees   CBC Auto Differential    Collection Time: 06/19/21  1:39 PM   Result Value Ref Range    WBC 6.2 3.5 - 11.3 k/uL    RBC 5.32 (H) 3.95 - 5.11 m/uL    Hemoglobin 12.5 11.9 - 15.1 g/dL    Hematocrit 42.8 36.3 - 47.1 %    MCV 80.5 (L) 82.6 - 102.9 fL    MCH 23.5 (L) 25.2 - 33.5 pg    MCHC 29.2 28.4 - 34.8 g/dL    RDW 15.9 (H) 11.8 - 14.4 %    Platelets 996 772 - 117 k/uL    MPV 10.4 8.1 - 13.5 fL    NRBC Automated 0.0 0.0 per 100 WBC    Differential Type NOT REPORTED     Seg Neutrophils 75 (H) 36 - 65 %    Lymphocytes 18 (L) 24 - 43 %    Monocytes 5 3 - 12 %    Eosinophils % 1 1 - 4 %    Basophils 1 0 - 2 %    Immature Granulocytes 0 0 %    Segs Absolute 4.66 1.50 - 8.10 k/uL    Absolute Lymph # 1.14 1.10 - 3.70 k/uL    Absolute Mono # 0.28 0.10 - 1.20 k/uL    Absolute Eos # 0.03 0.00 - 0.44 k/uL    Basophils Absolute 0.05 0.00 - 0.20 k/uL    Absolute Immature Granulocyte <0.03 0.00 - 0.30 k/uL    WBC Morphology NOT REPORTED     RBC Morphology ANISOCYTOSIS PRESENT     Platelet Estimate NOT REPORTED    Basic Metabolic Panel w/ Reflex to MG    Collection Time: 06/19/21  1:39 PM   Result Value Ref Range    Glucose 121 (H) 70 - 99 mg/dL    BUN 9 6 - 20 mg/dL    CREATININE 0.44 (L) 0.50 - 0.90 mg/dL    Bun/Cre Ratio NOT REPORTED 9 - 20    Calcium 9.6 8.6 - 10.4 mg/dL    Sodium 139 135 - 144 mmol/L    Potassium 4.4 3.7 - 5.3 mmol/L    Chloride 97 (L) 98 - 107 mmol/L    CO2 33 (H) 20 - 31 mmol/L    Anion Gap 9 9 - 17 mmol/L    GFR Non-African American >60 >60 mL/min    GFR African American >60 >60 mL/min    GFR Comment          GFR Staging NOT REPORTED    Troponin    Collection Time: 06/19/21  1:39 PM   Result Value Ref Range    Troponin, High Sensitivity 75 (HH) 0 - 14 ng/L    Troponin T NOT REPORTED <0.03 ng/mL    Troponin Interp NOT REPORTED    D-Dimer, Quantitative    Collection Time: 06/19/21  1:39 PM   Result Value Ref Range    D-Dimer, Quant 0.42 mg/L FEU   Troponin    Collection Time: 06/19/21  2:48 PM   Result Value Ref Range    Troponin, High Sensitivity 67 (HH) 0 - 14 ng/L    Troponin T NOT REPORTED <0.03 ng/mL    Troponin Interp NOT REPORTED    Urinalysis Reflex to Culture    Collection Time: 06/19/21  3:52 PM    Specimen: Urine, clean catch   Result Value Ref Range    Color, UA YELLOW YELLOW    Turbidity UA CLOUDY (A) CLEAR    Glucose, Ur NEGATIVE NEGATIVE    Bilirubin Urine NEGATIVE NEGATIVE    Ketones, Urine SMALL (A) NEGATIVE    Specific Gravity, UA 1.028 1.005 - 1.030    Urine Hgb LARGE (A) NEGATIVE    pH, UA 7.5 5.0 - 8.0    Protein, UA NEGATIVE NEGATIVE    Urobilinogen, Urine Normal Normal    Nitrite, Urine POSITIVE (A) NEGATIVE    Leukocyte Esterase, Urine NEGATIVE NEGATIVE    Urinalysis Comments NOT REPORTED    Microscopic Urinalysis    Collection Time: 06/19/21  3:52 PM   Result Value Ref Range    -          WBC, UA None 0 - 5 /HPF    RBC, UA 20 TO 50 0 - 4 /HPF    Casts UA  0 - 8 /LPF     0 TO 2 HYALINE Reference range defined for non-centrifuged specimen. Crystals, UA NOT REPORTED None /HPF    Epithelial Cells UA 0 TO 2 0 - 5 /HPF    Renal Epithelial, UA NOT REPORTED 0 /HPF    Bacteria, UA MANY (A) None    Mucus, UA NOT REPORTED None    Trichomonas, UA NOT REPORTED None    Amorphous, UA NOT REPORTED None    Other Observations UA NOT REPORTED NOT REQ. Yeast, UA NOT REPORTED None         IMAGING    XR CHEST PORTABLE    Result Date: 6/19/2021  No acute process. CTA HEAD NECK W CONTRAST    Result Date: 6/19/2021  No flow limiting stenosis or large vessel occlusion visualized within the head or neck. IMPRESSION     Case of a 45 yo F with a PMHx of ALS presenting with acute on chronic positional vertigo x6 months with episodes lasting 2-3 minutes, today she had a longer course lasting 2 hours. RECOMMENDATIONS:   1. CTA H/N - negative  2. MRI Brain W WO wit IAC - ordered  3. Labs fairly unremarkable  4. UTI? 5. Meclizine 25 mg TID PRN for dizziness  6.  Vestibular rehab if MRI is negative. Thank you for the consultation. Will follow. Case consulted with Dr. Chacha Tavera.        Regina Laura MD, [facility]  PGY-3 Neurology  6/19/2021 at 4:23 PM

## 2021-06-19 NOTE — H&P
[facility]  Office: [address], DO, Jaimie Carpio, DO, Yanique Nunez, DO, Huang Nichols, DO, Krystal Moody MD, Nito Patiño MD, Cem Guerrero MD, Chasity Skaggs MD, Elaine Noel MD, Gustavo Dsouza MD, Isai Grossman MD, Heidy Cesar MD, Paula Donald, DO, Blossom Beckford MD, Mennie Holter, DO, Marilu Mcrae MD,  William Cruz, DO, Shahnaz Durbin MD, Grace Callahan MD, Thor Mckinley MD, Len Brice MD, Bryson Liu, [facility], [facility], CNP, Chris Marie, [facility], Tia Silva, CNS, Hong Dave, CNP, Elle Tai, CNP, Vicente Reyes, CNP, Stan Rivera, CNP, Devante Woods, CNP, Jose Dickens PA-C, Tere Coburn, [facility], Fouzia Hernadez, CNP, Paul Thomas, CNP, Mirza Landry, CNP, Atiya Benitez, CNP, Baljit Wheat, CNP, Tasneem Baires, [facility]    HISTORY AND PHYSICAL EXAMINATION            Date:   6/19/2021  Patient name:  Donell Luther  Date of admission:  6/19/2021  1:03 PM  MRN:   [id number]  Account:  [de-identified]  YOB: 1970  PCP:    Kinza Estes DO  Room:   27/27  Code Status:    Prior    Chief Complaint:     Chief Complaint   Patient presents with    Dizziness       History Obtained From:     patient, electronic medical record    History of Present Illness:     Donell Luther is a 46 y.o. Non-/non  female who presents with Dizziness   and is admitted to the hospital for the management of Elevated troponin. Pt is a 46year old female with medical history of ALS and GERD who presents to ED today with c/o dizziness x 6 months. She denies any chest pain or discomfort. She states that she has been having these \"dizzy spells\" for approximately 6 mos, they happen only when she wakes up and prior to getting out of bed, and last for several minutes. Today she states that the episode lasted longer than usual and she presents for evaluation.  She has an established relationship with neurology who recently gave her a diagnosis of ALS. She states that she has had weakness, balance problems, weight loss and a change in speech as well as dysphagia over the past 6 months that have been progressively worsening. She states that she has bulging disc(s) @ C4-6 with a vertebral spur and was unable to tolerate a stress test for surgery clearance so she had a heart cath that was \"clean\" on 21. Neurology speculated that this was causing her associated symptoms of weakness, particularly in the right leg weakness but has now determined that she has ALS. EMG on 2020. She had a left heart cath done on 21 for abnormal stress test. There were no noted abnormal findings. She denies fever, chills, N/V, chest pain or SOB. Today she was noted to have an elevated troponin and will be admitted observation to trend as well as neurology input. She is currently working full time. Is independent in her ADL's, uses no assistive devices. Denies use of tobacco or EtOH, does use THC regularly. *Of note, she is very tearful with interview and states that her 15year old son (who is in room) did not know she had a diagnosis of ALS. She has been trying to keep this diagnosis a secret. Past Medical History:     Past Medical History:   Diagnosis Date    Abnormal cardiovascular stress test 2021    moderate mid and apical anterior ischemia    ALS (amyotrophic lateral sclerosis) ([facility] [address]     NEUROLOGIST - DR. Misty Cueva - LAST VISIT 3/2021 - [facility]FERR[facility] TO [facility]    GERD (gastroesophageal reflux disease)     Well adult health check     PCP DR Amira Bird - 3/2021        Past Surgical History:     Past Surgical History:   Procedure Laterality Date    CARDIAC CATHETERIZATION  2021     SECTION      x2        Medications Prior to Admission:     Prior to Admission medications    Medication Sig Start Date End Date Taking?  Authorizing Provider   riluzole (Eliecer Nascimento) Shawn MG tablet Take 50 mg by mouth every 12 hours    Historical Provider, MD   omeprazole (PRILOSEC) 20 MG delayed release capsule Take 20 mg by mouth daily    Historical Provider, MD   Multiple Vitamins-Minerals (MULTIVITAMIN ADULT PO) Take by mouth    Historical Provider, MD   Cholecalciferol (VITAMIN D) 50 MCG ( UT) TABS tablet Take 2,000 Units by mouth daily    Historical Provider, MD   ferrous sulfate 325 (65 Fe) MG tablet Take 325 mg by mouth every other day     Historical Provider, MD        Allergies:     Patient has no known allergies. Social History:     Tobacco:    reports that she has never smoked. She has never used smokeless tobacco.  Alcohol:      reports previous alcohol use. Drug Use:  reports no history of drug use. Family History:     Family History   Problem Relation Age of Onset    High Blood Pressure Mother     Hypertension Mother     Cancer Father         throat cancer       Review of Systems:     Positive and Negative as described in HPI. Review of Systems   Constitutional: Positive for activity change. HENT: Negative. Eyes: Negative. Respiratory: Negative. Cardiovascular: Negative. Gastrointestinal: Negative. Endocrine: Negative. Genitourinary: Negative. Musculoskeletal: Positive for gait problem. Skin: Negative. Allergic/Immunologic: Negative. Neurological: Positive for dizziness, speech difficulty and weakness. Negative for seizures, numbness and headaches. Hematological: Negative. Psychiatric/Behavioral: Negative. Physical Exam:   /79   Pulse 94   Temp 98.3 °F (36.8 °C) (Oral)   Resp 18   LMP 2021   SpO2 93%   Temp (24hrs), Av.3 °F (36.8 °C), Min:98.3 °F (36.8 °C), Max:98.3 °F (36.8 °C)    No results for input(s): POCGLU in the last 72 hours. No intake or output data in the 24 hours ending 21    Physical Exam  Vitals and nursing note reviewed. Constitutional:       Appearance: Normal appearance.  She is underweight. HENT:      Mouth/Throat:      Mouth: Mucous membranes are moist.      Pharynx: Oropharynx is clear. Eyes:      Pupils: Pupils are equal, round, and reactive to light. Cardiovascular:      Rate and Rhythm: Normal rate and regular rhythm. Pulses: Normal pulses. Heart sounds: Normal heart sounds, S1 normal and S2 normal.   Pulmonary:      Effort: Pulmonary effort is normal.      Breath sounds: Normal breath sounds. Abdominal:      General: Abdomen is flat. Bowel sounds are normal. There is no distension. Palpations: Abdomen is soft. Tenderness: There is no abdominal tenderness. There is no right CVA tenderness or left CVA tenderness. Genitourinary:     Comments: On menses. Musculoskeletal:      Cervical back: Pain with movement present. Decreased range of motion. Right lower leg: No edema. Left lower leg: No edema. Comments: Hand stiffness bilaterally. Lymphadenopathy:      Comments: No adenopathy   Skin:     General: Skin is warm and dry. Capillary Refill: Capillary refill takes less than 2 seconds. Neurological:      Mental Status: She is alert and oriented to person, place, and time. Motor: Weakness present. Comments: Speech delayed. Hand to nose delayed. Did not observe gait. Foot pushes equal bilaterally. Psychiatric:         Attention and Perception: Attention normal.         Mood and Affect: Mood is depressed. Speech: Speech is delayed. Behavior: Behavior normal. Behavior is cooperative.          Investigations:      Laboratory Testing:  Recent Results (from the past 24 hour(s))   EKG 12 Lead    Collection Time: 06/19/21  1:18 PM   Result Value Ref Range    Ventricular Rate 96 BPM    Atrial Rate 96 BPM    P-R Interval 122 ms    QRS Duration 52 ms    Q-T Interval 360 ms    QTc Calculation (Bazett) 454 ms    P Axis 75 degrees    R Axis 17 degrees    T Axis 23 degrees   CBC Auto Differential    Collection Time: 06/19/21  1:39 PM   Result Value Ref Range    WBC 6.2 3.5 - 11.3 k/uL    RBC 5.32 (H) 3.95 - 5.11 m/uL    Hemoglobin 12.5 11.9 - 15.1 g/dL    Hematocrit 42.8 36.3 - 47.1 %    MCV 80.5 (L) 82.6 - 102.9 fL    MCH 23.5 (L) 25.2 - 33.5 pg    MCHC 29.2 28.4 - 34.8 g/dL    RDW 15.9 (H) 11.8 - 14.4 %    Platelets 027 940 - 786 k/uL    MPV 10.4 8.1 - 13.5 fL    NRBC Automated 0.0 0.0 per 100 WBC    Differential Type NOT REPORTED     Seg Neutrophils 75 (H) 36 - 65 %    Lymphocytes 18 (L) 24 - 43 %    Monocytes 5 3 - 12 %    Eosinophils % 1 1 - 4 %    Basophils 1 0 - 2 %    Immature Granulocytes 0 0 %    Segs Absolute 4.66 1.50 - 8.10 k/uL    Absolute Lymph # 1.14 1.10 - 3.70 k/uL    Absolute Mono # 0.28 0.10 - 1.20 k/uL    Absolute Eos # 0.03 0.00 - 0.44 k/uL    Basophils Absolute 0.05 0.00 - 0.20 k/uL    Absolute Immature Granulocyte <0.03 0.00 - 0.30 k/uL    WBC Morphology NOT REPORTED     RBC Morphology ANISOCYTOSIS PRESENT     Platelet Estimate NOT REPORTED    Basic Metabolic Panel w/ Reflex to MG    Collection Time: 06/19/21  1:39 PM   Result Value Ref Range    Glucose 121 (H) 70 - 99 mg/dL    BUN 9 6 - 20 mg/dL    CREATININE 0.44 (L) 0.50 - 0.90 mg/dL    Bun/Cre Ratio NOT REPORTED 9 - 20    Calcium 9.6 8.6 - 10.4 mg/dL    Sodium 139 135 - 144 mmol/L    Potassium 4.4 3.7 - 5.3 mmol/L    Chloride 97 (L) 98 - 107 mmol/L    CO2 33 (H) 20 - 31 mmol/L    Anion Gap 9 9 - 17 mmol/L    GFR Non-African American >60 >60 mL/min    GFR African American >60 >60 mL/min    GFR Comment          GFR Staging NOT REPORTED    Troponin    Collection Time: 06/19/21  1:39 PM   Result Value Ref Range    Troponin, High Sensitivity 75 (HH) 0 - 14 ng/L    Troponin T NOT REPORTED <0.03 ng/mL    Troponin Interp NOT REPORTED    D-Dimer, Quantitative    Collection Time: 06/19/21  1:39 PM   Result Value Ref Range    D-Dimer, Quant 0.42 mg/L FEU   Troponin    Collection Time: 06/19/21  2:48 PM   Result Value Ref Range    Troponin, High Sensitivity 67 (HH) 0 - 14 ng/L    Troponin T NOT REPORTED <0.03 ng/mL    Troponin Interp NOT REPORTED    Urinalysis Reflex to Culture    Collection Time: 06/19/21  3:52 PM    Specimen: Urine, clean catch   Result Value Ref Range    Color, UA YELLOW YELLOW    Turbidity UA CLOUDY (A) CLEAR    Glucose, Ur NEGATIVE NEGATIVE    Bilirubin Urine NEGATIVE NEGATIVE    Ketones, Urine SMALL (A) NEGATIVE    Specific Gravity, UA 1.028 1.005 - 1.030    Urine Hgb LARGE (A) NEGATIVE    pH, UA 7.5 5.0 - 8.0    Protein, UA NEGATIVE NEGATIVE    Urobilinogen, Urine Normal Normal    Nitrite, Urine POSITIVE (A) NEGATIVE    Leukocyte Esterase, Urine NEGATIVE NEGATIVE    Urinalysis Comments NOT REPORTED    Microscopic Urinalysis    Collection Time: 06/19/21  3:52 PM   Result Value Ref Range    -          WBC, UA None 0 - 5 /HPF    RBC, UA 20 TO 50 0 - 4 /HPF    Casts UA  0 - 8 /LPF     0 TO 2 HYALINE Reference range defined for non-centrifuged specimen. Crystals, UA NOT REPORTED None /HPF    Epithelial Cells UA 0 TO 2 0 - 5 /HPF    Renal Epithelial, UA NOT REPORTED 0 /HPF    Bacteria, UA MANY (A) None    Mucus, UA NOT REPORTED None    Trichomonas, UA NOT REPORTED None    Amorphous, UA NOT REPORTED None    Other Observations UA NOT REPORTED NOT REQ. Yeast, UA NOT REPORTED None       Imaging/Diagnostics:  XR CHEST PORTABLE    Result Date: 6/19/2021  No acute process. CTA HEAD NECK W CONTRAST    Result Date: 6/19/2021  No flow limiting stenosis or large vessel occlusion visualized within the head or neck. Assessment :      Hospital Problems         Last Modified POA    * (Principal) Elevated troponin 6/19/2021 Yes    Vertigo 6/19/2021 Yes    ALS (amyotrophic lateral sclerosis) ([facility] [address] 6/19/2021 Yes          Plan:     Patient status observation in the Med/Surge    1. Inpatient consult to cardiology. Appreciate input. Trend trops. EKG for any chest pain. VS per floor. Continuous telemetry monitoring. Cardiac diet.   2. Inpatient consult to

## 2021-06-19 NOTE — ED NOTES
Writer received call from MRI stating that pt is requesting more medication to complete scan. Dr. Hayley Taylor notified and verbal orders for 1mg versed IV given.      Alejandra Matson RN  06/19/21 1726

## 2021-06-19 NOTE — ED NOTES
Pt arrived to the ED with complaint of dizziness. Pt states that this morning when she turned her head to the right it started to get fuzzy. No LOC. Pt reports she is feeling more lethargic than usual. Pt received EKG at bedside. Pt has been place on continuous cardiac monitor. Pt is alert and oriented X4. Pt reports decrease in PO intake. Pt speech is clear and articulate. Pts respirations are unlabored. Will continue plan of care.       Sami Heading  06/19/21 [id number]

## 2021-06-19 NOTE — ED PROVIDER NOTES
[facility] ED  Emergency Department Encounter  EmergencyMedicine Resident     Pt Name:Hermelinda Quezada  MRN: [id number]  Birthdate 1970  Date of evaluation: 21  PCP:  Jt Harris DO    CHIEF COMPLAINT       Chief Complaint   Patient presents with    Dizziness       HISTORY OF PRESENT ILLNESS  (Location/Symptom, Timing/Onset, Context/Setting, Quality, Duration, Modifying Factors, Severity.)      Brandee Chahal is a 46 y.o. female who presents with dizziness. Patient presents with a few days of dizziness when she moves her head to the right into the left, this is associated with chest pain that is intermittent, anterior, nonradiating, mild severity, sharp, associated with shortness of breath. Patient denies fevers, chills, cough, congestion, diaphoresis, palpitations, abdominal pain, nausea, vomiting, diarrhea, dysuria, lower extremity swelling or pain. Patient has history of ALS, denies history of HTN, HLD, DM, CAD, stroke, DVT/PE. Patient had a cardiac catheterization on 2021, no evidence of CAD. PAST MEDICAL / SURGICAL / SOCIAL / FAMILY HISTORY      has a past medical history of Abnormal cardiovascular stress test, ALS (amyotrophic lateral sclerosis) ([facility] [address] GERD (gastroesophageal reflux disease), and Well adult health check. has a past surgical history that includes  section and Cardiac catheterization (2021).       Social History     Socioeconomic History    Marital status: Single     Spouse name: Not on file    Number of children: Not on file    Years of education: Not on file    Highest education level: Not on file   Occupational History    Not on file   Tobacco Use    Smoking status: Never Smoker    Smokeless tobacco: Never Used   Vaping Use    Vaping Use: Never used   Substance and Sexual Activity    Alcohol use: Not Currently    Drug use: No    Sexual activity: Not Currently     Partners: Male   Other Topics Concern    Not on file   Social Constitutional: Negative for chills, diaphoresis, fatigue and fever. HENT: Negative for congestion, rhinorrhea and sore throat. Eyes: Negative for visual disturbance. Respiratory: Positive for shortness of breath. Negative for cough and wheezing. Cardiovascular: Positive for chest pain. Negative for palpitations and leg swelling. Gastrointestinal: Negative for abdominal pain, blood in stool, constipation, diarrhea, nausea and vomiting. Genitourinary: Negative for dysuria and hematuria. Musculoskeletal: Negative for neck pain and neck stiffness. Skin: Negative for pallor and rash. Neurological: Positive for dizziness and light-headedness. Negative for syncope, weakness and headaches. Psychiatric/Behavioral: Negative for confusion. PHYSICAL EXAM   (up to 7 for level 4, 8 or more for level 5)      INITIAL VITALS:   /79   Pulse 94   Temp 98.3 °F (36.8 °C) (Oral)   Resp 18   LMP 06/16/2021   SpO2 93%     Physical Exam  Constitutional:       General: She is not in acute distress. Appearance: She is well-developed. She is not ill-appearing, toxic-appearing or diaphoretic. Comments: Patient alert oriented, well communicative, no acute distress, nontoxic-appearing, appears cachectic   HENT:      Head: Normocephalic and atraumatic. Right Ear: Tympanic membrane, ear canal and external ear normal. There is no impacted cerumen. Left Ear: Tympanic membrane, ear canal and external ear normal. There is no impacted cerumen. Nose: Nose normal. No congestion or rhinorrhea. Mouth/Throat:      Mouth: Mucous membranes are moist.      Pharynx: Oropharynx is clear. No oropharyngeal exudate or posterior oropharyngeal erythema. Eyes:      General:         Right eye: No discharge. Left eye: No discharge. Extraocular Movements: Extraocular movements intact. Pupils: Pupils are equal, round, and reactive to light. Neck:      Vascular: No JVD. Trachea: No tracheal deviation. Cardiovascular:      Rate and Rhythm: Normal rate and regular rhythm. Pulses: Normal pulses. Heart sounds: Normal heart sounds. No murmur heard. No friction rub. No gallop. Pulmonary:      Effort: Pulmonary effort is normal. No respiratory distress. Breath sounds: Normal breath sounds. No stridor. No wheezing, rhonchi or rales. Chest:      Chest wall: No tenderness. Abdominal:      General: There is no distension. Palpations: Abdomen is soft. There is no mass. Tenderness: There is no abdominal tenderness. There is no right CVA tenderness, left CVA tenderness or guarding. Musculoskeletal:         General: No tenderness. Normal range of motion. Cervical back: Normal range of motion and neck supple. No rigidity or tenderness. Right lower leg: No edema. Left lower leg: No edema. Skin:     General: Skin is warm. Capillary Refill: Capillary refill takes less than 2 seconds. Neurological:      General: No focal deficit present. Mental Status: She is alert and oriented to person, place, and time. Cranial Nerves: No cranial nerve deficit. Sensory: No sensory deficit. Motor: No weakness.       Coordination: Coordination normal.      Gait: Gait normal.   Psychiatric:         Mood and Affect: Mood normal.         Behavior: Behavior normal.         DIFFERENTIAL  DIAGNOSIS     PLAN (LABS / IMAGING / EKG):  Orders Placed This Encounter   Procedures    CTA HEAD NECK W CONTRAST    XR CHEST PORTABLE    MRI BRAIN W WO CONTRAST    CBC Auto Differential    Basic Metabolic Panel w/ Reflex to MG    Troponin    D-Dimer, Quantitative    Urinalysis Reflex to Culture    Microscopic Urinalysis    Troponin    Telemetry monitoring - continuous duration    Inpatient consult to Neurology    Inpatient consult to Cardiology    Inpatient consult to Internal Medicine    Inpatient consult to Hospitalist    EKG 12 Lead    PATIENT STATUS (FROM ED OR OR/PROCEDURAL) Observation       MEDICATIONS ORDERED:  Orders Placed This Encounter   Medications    iopamidol (ISOVUE-370) 76 % injection 90 mL    aspirin chewable tablet 324 mg    midazolam PF (VERSED) injection 2 mg       DDX:     DIAGNOSTIC RESULTS / EMERGENCY DEPARTMENT COURSE / MDM   LAB RESULTS:  Results for orders placed or performed during the hospital encounter of 06/19/21   CBC Auto Differential   Result Value Ref Range    WBC 6.2 3.5 - 11.3 k/uL    RBC 5.32 (H) 3.95 - 5.11 m/uL    Hemoglobin 12.5 11.9 - 15.1 g/dL    Hematocrit 42.8 36.3 - 47.1 %    MCV 80.5 (L) 82.6 - 102.9 fL    MCH 23.5 (L) 25.2 - 33.5 pg    MCHC 29.2 28.4 - 34.8 g/dL    RDW 15.9 (H) 11.8 - 14.4 %    Platelets 585 150 - 326 k/uL    MPV 10.4 8.1 - 13.5 fL    NRBC Automated 0.0 0.0 per 100 WBC    Differential Type NOT REPORTED     Seg Neutrophils 75 (H) 36 - 65 %    Lymphocytes 18 (L) 24 - 43 %    Monocytes 5 3 - 12 %    Eosinophils % 1 1 - 4 %    Basophils 1 0 - 2 %    Immature Granulocytes 0 0 %    Segs Absolute 4.66 1.50 - 8.10 k/uL    Absolute Lymph # 1.14 1.10 - 3.70 k/uL    Absolute Mono # 0.28 0.10 - 1.20 k/uL    Absolute Eos # 0.03 0.00 - 0.44 k/uL    Basophils Absolute 0.05 0.00 - 0.20 k/uL    Absolute Immature Granulocyte <0.03 0.00 - 0.30 k/uL    WBC Morphology NOT REPORTED     RBC Morphology ANISOCYTOSIS PRESENT     Platelet Estimate NOT REPORTED    Basic Metabolic Panel w/ Reflex to MG   Result Value Ref Range    Glucose 121 (H) 70 - 99 mg/dL    BUN 9 6 - 20 mg/dL    CREATININE 0.44 (L) 0.50 - 0.90 mg/dL    Bun/Cre Ratio NOT REPORTED 9 - 20    Calcium 9.6 8.6 - 10.4 mg/dL    Sodium 139 135 - 144 mmol/L    Potassium 4.4 3.7 - 5.3 mmol/L    Chloride 97 (L) 98 - 107 mmol/L    CO2 33 (H) 20 - 31 mmol/L    Anion Gap 9 9 - 17 mmol/L    GFR Non-African American >60 >60 mL/min    GFR African American >60 >60 mL/min    GFR Comment          GFR Staging NOT REPORTED    Troponin   Result Value Ref Range Troponin, High Sensitivity 75 (HH) 0 - 14 ng/L    Troponin T NOT REPORTED <0.03 ng/mL    Troponin Interp NOT REPORTED    Troponin   Result Value Ref Range    Troponin, High Sensitivity 67 (HH) 0 - 14 ng/L    Troponin T NOT REPORTED <0.03 ng/mL    Troponin Interp NOT REPORTED    D-Dimer, Quantitative   Result Value Ref Range    D-Dimer, Quant 0.42 mg/L FEU   Urinalysis Reflex to Culture    Specimen: Urine, clean catch   Result Value Ref Range    Color, UA YELLOW YELLOW    Turbidity UA CLOUDY (A) CLEAR    Glucose, Ur NEGATIVE NEGATIVE    Bilirubin Urine NEGATIVE NEGATIVE    Ketones, Urine SMALL (A) NEGATIVE    Specific Gravity, UA 1.028 1.005 - 1.030    Urine Hgb LARGE (A) NEGATIVE    pH, UA 7.5 5.0 - 8.0    Protein, UA NEGATIVE NEGATIVE    Urobilinogen, Urine Normal Normal    Nitrite, Urine POSITIVE (A) NEGATIVE    Leukocyte Esterase, Urine NEGATIVE NEGATIVE    Urinalysis Comments NOT REPORTED    Microscopic Urinalysis   Result Value Ref Range    -          WBC, UA None 0 - 5 /HPF    RBC, UA 20 TO 50 0 - 4 /HPF    Casts UA  0 - 8 /LPF     0 TO 2 HYALINE Reference range defined for non-centrifuged specimen. Crystals, UA NOT REPORTED None /HPF    Epithelial Cells UA 0 TO 2 0 - 5 /HPF    Renal Epithelial, UA NOT REPORTED 0 /HPF    Bacteria, UA MANY (A) None    Mucus, UA NOT REPORTED None    Trichomonas, UA NOT REPORTED None    Amorphous, UA NOT REPORTED None    Other Observations UA NOT REPORTED NOT REQ.     Yeast, UA NOT REPORTED None   EKG 12 Lead   Result Value Ref Range    Ventricular Rate 96 BPM    Atrial Rate 96 BPM    P-R Interval 122 ms    QRS Duration 52 ms    Q-T Interval 360 ms    QTc Calculation (Bazett) 454 ms    P Axis 75 degrees    R Axis 17 degrees    T Axis 23 degrees       IMPRESSION: 63-year-old female with history of ALS, no other significant past medical history presenting with dizziness when turning head, no focal deficits, able to ambulate, no nystagmus, no concern for posterior stroke, however there is concern for dissection versus partial occlusion of carotids, will obtain CTA head and neck for further evaluation. Also concern for ACS, will obtain EKG, troponins. Concern for PE, will obtain dimer as patient is low risk. Will obtain basic labs to evaluate for anemia, electrolyte abnormality. Will consult neurology. RADIOLOGY:  XR CHEST PORTABLE    Result Date: 6/19/2021  EXAMINATION: ONE XRAY VIEW OF THE CHEST 6/19/2021 1:52 pm COMPARISON: Chest January 3, 2020. HISTORY: ORDERING SYSTEM PROVIDED HISTORY: Chest pain TECHNOLOGIST PROVIDED HISTORY: Chest pain FINDINGS: The lungs are without acute focal process. There is no effusion or pneumothorax. The cardiomediastinal silhouette is without acute process. No acute process. CTA HEAD NECK W CONTRAST    Result Date: 6/19/2021  EXAMINATION: CTA OF THE HEAD AND NECK WITH CONTRAST 6/19/2021 2:16 pm: TECHNIQUE: CTA of the head and neck was performed with the administration of intravenous contrast. Multiplanar reformatted images are provided for review. MIP images are provided for review. Stenosis of the internal carotid arteries measured using NASCET criteria. Dose modulation, iterative reconstruction, and/or weight based adjustment of the mA/kV was utilized to reduce the radiation dose to as low as reasonably achievable. COMPARISON: None. HISTORY: ORDERING SYSTEM PROVIDED HISTORY: Concern for carotid dissection TECHNOLOGIST PROVIDED HISTORY: Concern for carotid dissection Decision Support Exception - unselect if not a suspected or confirmed emergency medical condition->Emergency Medical Condition (MA) Reason for Exam: Concern for carotid dissection FINDINGS: CTA NECK: AORTIC ARCH/ARCH VESSELS: No dissection or arterial injury. No significant stenosis of the brachiocephalic or subclavian arteries. CAROTID ARTERIES: No dissection, arterial injury, or hemodynamically significant stenosis by NASCET criteria.  VERTEBRAL ARTERIES: No dissection, arterial injury, or significant stenosis. SOFT TISSUES: The lung apices are clear. No cervical or superior mediastinal lymphadenopathy. The larynx and pharynx are unremarkable. No acute abnormality of the salivary and thyroid glands. BONES: No acute osseous abnormality. CTA HEAD: ANTERIOR CIRCULATION: No significant stenosis of the intracranial internal carotid, anterior cerebral, or middle cerebral arteries. No aneurysm. There is a right-sided posterior communicating artery. POSTERIOR CIRCULATION: No significant stenosis of the vertebral, basilar, or posterior cerebral arteries. No aneurysm. OTHER: No dural venous sinus thrombosis on this non-dedicated study. BRAIN: A noncontrast exam was not included as part of this exam limiting evaluation. No obvious mass or bleed is seen. There is no obvious evidence of an acute infarct. No flow limiting stenosis or large vessel occlusion visualized within the head or neck. EKG  EKG Interpretation    Interpreted by me    Rhythm: normal sinus   Rate: normal  Axis: normal  Ectopy: none  Conduction: normal  ST Segments: no acute change  T Waves: no acute change  Q Waves: none    Clinical Impression: no acute changes and normal EKG    All EKG's are interpreted by the Emergency Department Physician who either signs or Co-signs this chart in the absence of a cardiologist.    EMERGENCY DEPARTMENT COURSE:  Patient came to emergency department, HPI and physical exam were conducted. All nursing notes were reviewed. EKG found no acute changes, initial troponin 75, follow-up troponin 67, discussed with cardiology who recommended trending troponin, starting heparin if troponins start to rise, but otherwise hold off on heparin. CT found no acute abnormalities of the head, consulted with neurology recommended further work-up with MRI. Admitted patient to [facility] for further management.       PROCEDURES:      CONSULTS:  IP CONSULT TO NEUROLOGY  IP CONSULT TO CARDIOLOGY  IP CONSULT TO INTERNAL MEDICINE  IP CONSULT TO HOSPITALIST  IP CONSULT TO CARDIOLOGY    CRITICAL CARE:      FINAL IMPRESSION      1. Dizziness          DISPOSITION / PLAN     DISPOSITION Admitted 06/19/2021 05:30:08 PM      PATIENT REFERRED TO:  No follow-up provider specified.     DISCHARGE MEDICATIONS:  New Prescriptions    No medications on file       Kim Kothari MD  Emergency Medicine Resident    (Please note that portions of thisnote were completed with a voice recognition program.  Efforts were made to edit the dictations but occasionally words are mis-transcribed.)        Kim Kothari MD  Resident  06/19/21 [id number]

## 2021-06-20 NOTE — PROGRESS NOTES
Normocephalic, without obvious abnormality   Neck Supple, symmetrical. Good ROM. No midline or paraspinal tenderness. Lungs Respirations unlabored, no wheezing   Chest Wall No deformity   Heart RRR, no murmur   Abdomen Soft. Non-tender, non-distended   Extremities No cyanosis or edema or warmth. Pulses 2+ and symmetric   Skin: Skin  turgor normal, no rashes or lesions     Mental status  Speech Alert. Oriented to person, place, and time.   -Dysarthria is present  No hallucinations or delusions. No SI/HI. Cranial nerves   II - VFF, visual threat intact  III, IV, VI - extra-ocular muscles full. No nystagmus. Pupils symmetric and responsive.    V - sensation symmetric         VII -  No facial droop or asymmetric NLF  VIII - intact hearing to conversational tone          IX, X - symmetrical palate elevation   XI - 5/5 strength symmetric  XII - tongue midline   Motor function  Strength: Generalized weakness and atrophy  Bulk: Diffuse muscle atrophy  -Fasciculation is noted throughout  Tone: symmetric b/l arms and legs  Abnormal movements: No abnormal movements or tremor   Sensory function Symmetric to touch in all extremities bilaterally   Cerebellar No dysmetria or dysdiadochocinesia    Reflex function DTR:        2+ b/l symmetric in biceps, brachioradialis, patellar, calcaneal  Babinski b/l plantar downgoing   Gait                  Not assessed       Investigations:      Laboratory Testing:  Recent Results (from the past 24 hour(s))   EKG 12 Lead    Collection Time: 06/19/21  1:18 PM   Result Value Ref Range    Ventricular Rate 96 BPM    Atrial Rate 96 BPM    P-R Interval 122 ms    QRS Duration 52 ms    Q-T Interval 360 ms    QTc Calculation (Bazett) 454 ms    P Axis 75 degrees    R Axis 17 degrees    T Axis 23 degrees   CBC Auto Differential    Collection Time: 06/19/21  1:39 PM   Result Value Ref Range    WBC 6.2 3.5 - 11.3 k/uL    RBC 5.32 (H) 3.95 - 5.11 m/uL    Hemoglobin 12.5 11.9 - 15.1 g/dL    Hematocrit 42.8 36.3 - 47.1 %    MCV 80.5 (L) 82.6 - 102.9 fL    MCH 23.5 (L) 25.2 - 33.5 pg    MCHC 29.2 28.4 - 34.8 g/dL    RDW 15.9 (H) 11.8 - 14.4 %    Platelets 126 590 - 451 k/uL    MPV 10.4 8.1 - 13.5 fL    NRBC Automated 0.0 0.0 per 100 WBC    Differential Type NOT REPORTED     Seg Neutrophils 75 (H) 36 - 65 %    Lymphocytes 18 (L) 24 - 43 %    Monocytes 5 3 - 12 %    Eosinophils % 1 1 - 4 %    Basophils 1 0 - 2 %    Immature Granulocytes 0 0 %    Segs Absolute 4.66 1.50 - 8.10 k/uL    Absolute Lymph # 1.14 1.10 - 3.70 k/uL    Absolute Mono # 0.28 0.10 - 1.20 k/uL    Absolute Eos # 0.03 0.00 - 0.44 k/uL    Basophils Absolute 0.05 0.00 - 0.20 k/uL    Absolute Immature Granulocyte <0.03 0.00 - 0.30 k/uL    WBC Morphology NOT REPORTED     RBC Morphology ANISOCYTOSIS PRESENT     Platelet Estimate NOT REPORTED    Basic Metabolic Panel w/ Reflex to MG    Collection Time: 06/19/21  1:39 PM   Result Value Ref Range    Glucose 121 (H) 70 - 99 mg/dL    BUN 9 6 - 20 mg/dL    CREATININE 0.44 (L) 0.50 - 0.90 mg/dL    Bun/Cre Ratio NOT REPORTED 9 - 20    Calcium 9.6 8.6 - 10.4 mg/dL    Sodium 139 135 - 144 mmol/L    Potassium 4.4 3.7 - 5.3 mmol/L    Chloride 97 (L) 98 - 107 mmol/L    CO2 33 (H) 20 - 31 mmol/L    Anion Gap 9 9 - 17 mmol/L    GFR Non-African American >60 >60 mL/min    GFR African American >60 >60 mL/min    GFR Comment          GFR Staging NOT REPORTED    Troponin    Collection Time: 06/19/21  1:39 PM   Result Value Ref Range    Troponin, High Sensitivity 75 (HH) 0 - 14 ng/L    Troponin T NOT REPORTED <0.03 ng/mL    Troponin Interp NOT REPORTED    D-Dimer, Quantitative    Collection Time: 06/19/21  1:39 PM   Result Value Ref Range    D-Dimer, Quant 0.42 mg/L FEU   Troponin    Collection Time: 06/19/21  2:48 PM   Result Value Ref Range    Troponin, High Sensitivity 67 (HH) 0 - 14 ng/L    Troponin T NOT REPORTED <0.03 ng/mL    Troponin Interp NOT REPORTED    Urinalysis Reflex to Culture    Collection Time: 06/19/21  3:52 PM    Specimen: Urine, clean catch   Result Value Ref Range    Color, UA YELLOW YELLOW    Turbidity UA CLOUDY (A) CLEAR    Glucose, Ur NEGATIVE NEGATIVE    Bilirubin Urine NEGATIVE NEGATIVE    Ketones, Urine SMALL (A) NEGATIVE    Specific Gravity, UA 1.028 1.005 - 1.030    Urine Hgb LARGE (A) NEGATIVE    pH, UA 7.5 5.0 - 8.0    Protein, UA NEGATIVE NEGATIVE    Urobilinogen, Urine Normal Normal    Nitrite, Urine POSITIVE (A) NEGATIVE    Leukocyte Esterase, Urine NEGATIVE NEGATIVE    Urinalysis Comments NOT REPORTED    Microscopic Urinalysis    Collection Time: 06/19/21  3:52 PM   Result Value Ref Range    -          WBC, UA None 0 - 5 /HPF    RBC, UA 20 TO 50 0 - 4 /HPF    Casts UA  0 - 8 /LPF     0 TO 2 HYALINE Reference range defined for non-centrifuged specimen. Crystals, UA NOT REPORTED None /HPF    Epithelial Cells UA 0 TO 2 0 - 5 /HPF    Renal Epithelial, UA NOT REPORTED 0 /HPF    Bacteria, UA MANY (A) None    Mucus, UA NOT REPORTED None    Trichomonas, UA NOT REPORTED None    Amorphous, UA NOT REPORTED None    Other Observations UA NOT REPORTED NOT REQ.     Yeast, UA NOT REPORTED None   Troponin    Collection Time: 06/19/21  6:47 PM   Result Value Ref Range    Troponin, High Sensitivity 64 (HH) 0 - 14 ng/L    Troponin T NOT REPORTED <0.03 ng/mL    Troponin Interp NOT REPORTED    Basic Metabolic Panel w/ Reflex to MG    Collection Time: 06/20/21  4:53 AM   Result Value Ref Range    Glucose 103 (H) 70 - 99 mg/dL    BUN 8 6 - 20 mg/dL    CREATININE 0.34 (L) 0.50 - 0.90 mg/dL    Bun/Cre Ratio NOT REPORTED 9 - 20    Calcium 8.7 8.6 - 10.4 mg/dL    Sodium 141 135 - 144 mmol/L    Potassium 3.9 3.7 - 5.3 mmol/L    Chloride 101 98 - 107 mmol/L    CO2 35 (H) 20 - 31 mmol/L    Anion Gap 5 (L) 9 - 17 mmol/L    GFR Non-African American >60 >60 mL/min    GFR African American >60 >60 mL/min    GFR Comment          GFR Staging NOT REPORTED    CBC    Collection Time: 06/20/21  4:53 AM   Result Value Ref Range    WBC 6.1 3.5 - 11.3 k/uL    RBC 4.70 3.95 - 5.11 m/uL    Hemoglobin 10.9 (L) 11.9 - 15.1 g/dL    Hematocrit 38.5 36.3 - 47.1 %    MCV 81.9 (L) 82.6 - 102.9 fL    MCH 23.2 (L) 25.2 - 33.5 pg    MCHC 28.3 (L) 28.4 - 34.8 g/dL    RDW 15.9 (H) 11.8 - 14.4 %    Platelets 587 764 - 817 k/uL    MPV 10.3 8.1 - 13.5 fL    NRBC Automated 0.0 0.0 per 100 WBC   Protime-INR    Collection Time: 06/20/21  4:53 AM   Result Value Ref Range    Protime 10.8 9.1 - 12.3 sec    INR 1.0        Imaging/Diagnostics:  XR CHEST PORTABLE    Result Date: 6/19/2021  EXAMINATION: ONE XRAY VIEW OF THE CHEST 6/19/2021 1:52 pm COMPARISON: Chest January 3, 2020. HISTORY: ORDERING SYSTEM PROVIDED HISTORY: Chest pain TECHNOLOGIST PROVIDED HISTORY: Chest pain FINDINGS: The lungs are without acute focal process. There is no effusion or pneumothorax. The cardiomediastinal silhouette is without acute process. No acute process. CTA HEAD NECK W CONTRAST    Result Date: 6/19/2021  EXAMINATION: CTA OF THE HEAD AND NECK WITH CONTRAST 6/19/2021 2:16 pm: TECHNIQUE: CTA of the head and neck was performed with the administration of intravenous contrast. Multiplanar reformatted images are provided for review. MIP images are provided for review. Stenosis of the internal carotid arteries measured using NASCET criteria. Dose modulation, iterative reconstruction, and/or weight based adjustment of the mA/kV was utilized to reduce the radiation dose to as low as reasonably achievable. COMPARISON: None. HISTORY: ORDERING SYSTEM PROVIDED HISTORY: Concern for carotid dissection TECHNOLOGIST PROVIDED HISTORY: Concern for carotid dissection Decision Support Exception - unselect if not a suspected or confirmed emergency medical condition->Emergency Medical Condition (MA) Reason for Exam: Concern for carotid dissection FINDINGS: CTA NECK: AORTIC ARCH/ARCH VESSELS: No dissection or arterial injury. No significant stenosis of the brachiocephalic or subclavian arteries.  CAROTID ARTERIES: No dissection, arterial injury, or hemodynamically significant stenosis by NASCET criteria. VERTEBRAL ARTERIES: No dissection, arterial injury, or significant stenosis. SOFT TISSUES: The lung apices are clear. No cervical or superior mediastinal lymphadenopathy. The larynx and pharynx are unremarkable. No acute abnormality of the salivary and thyroid glands. BONES: No acute osseous abnormality. CTA HEAD: ANTERIOR CIRCULATION: No significant stenosis of the intracranial internal carotid, anterior cerebral, or middle cerebral arteries. No aneurysm. There is a right-sided posterior communicating artery. POSTERIOR CIRCULATION: No significant stenosis of the vertebral, basilar, or posterior cerebral arteries. No aneurysm. OTHER: No dural venous sinus thrombosis on this non-dedicated study. BRAIN: A noncontrast exam was not included as part of this exam limiting evaluation. No obvious mass or bleed is seen. There is no obvious evidence of an acute infarct. No flow limiting stenosis or large vessel occlusion visualized within the head or neck. MRI BRAIN W WO CONTRAST    Result Date: 6/19/2021  EXAMINATION: MRI OF THE BRAIN WITHOUT AND WITH CONTRAST  6/19/2021 5:27 pm TECHNIQUE: Multiplanar multisequence MRI of the head/brain was performed without and with the administration of intravenous contrast. COMPARISON: None. HISTORY: ORDERING SYSTEM PROVIDED HISTORY: Dizzy, rule out posterior stroke TECHNOLOGIST PROVIDED HISTORY: With IAC Dizzy, rule out posterior stroke Decision Support Exception - unselect if not a suspected or confirmed emergency medical condition->Emergency Medical Condition (MA) Is the patient pregnant?->No Acuity: Unknown Type of Exam: Ongoing FINDINGS: INTERNAL AUDITORY CANALS: No mass or abnormal enhancement within the cerebellopontine angle cisterns or internal auditory canals. No abnormal enhancement seen along of the facial or vestibulocochlear nerves.  The inner ear structures appear unremarkable. The middle ear cavities are clear. The cisternal segments of the trigeminal nerves appear unremarkable. INTRACRANIAL STRUCTURES/VENTRICLES:  There is no acute infarct. No mass effect or midline shift. No evidence of an acute intracranial hemorrhage. No abnormal extra-axial fluid collection. The ventricles and sulci are normal in size and configuration. The sellar/suprasellar regions appear unremarkable. The normal signal voids within the major intracranial vessels appear maintained. No abnormal focus of enhancement is seen within the brain. ORBITS: The visualized portion of the orbits demonstrate no acute abnormality. SINUSES: The visualized paranasal sinuses and mastoid air cells are well aerated. BONES/SOFT TISSUES: The bone marrow signal intensity appears normal. The soft tissues demonstrate no acute abnormality. No acute infarct. No acute IAC abnormality visualized.        Assessment & Differential Dx:      Primary Problem  Elevated troponin    Active Hospital Problems    Diagnosis Date Noted    Elevated troponin [R77.8] 06/19/2021    ALS (amyotrophic lateral sclerosis) (HCC) [G12.21]     Vertigo [R42]        Case of 49-year-old female, PMH: ALS, presented with dizziness vertigo, that was ongoing for the past 6-month, feeling of room spinning and lightheadedness, last for 2 to 3 minutes before spontaneously resolving, never woke up for vertigo  -Follows up with Dr. Jes Gibbs for ALS last seen on 3/5/2021, recent EMG 2/26/2021: Acute and chronic denervation in all muscles selected and B/L legs and LUE, recent difficulty swallowing liquids,    -MRI brain 3/1/2021: W0: Negative,  -MRI C-spine showing mid line disc extrusion at C4-C5, with severe canal stenosis with referral to      Impression:  -  Possible BPPV  -  Lower suspicion for vestibular neuritis  -  Hints exam is negative    Plan:     -CTA: Negative, no large vessel occlusion,  -MRI brain [employer] 0: No acute infarct, no acute IAC abnormality visualized  -Labs: Unremarkable  -Meclizine 25 mg 3 times daily as needed for dizziness  -Vestibular rehab if MRI is negative  -DVT prophylaxis: Lovenox daily  -neurologically pt can be discharged      Susan Ochoa MD, MD, 6/20/2021 9:20 AM

## 2021-06-20 NOTE — CONSULTS
Attestation signed by      Attending Physician Statement:    I have discussed the care of  [address] [place] [address] , including pertinent history and exam findings, with the Cardiology fellow/resident. I have seen and examined the patient and the key elements of all parts of the encounter have been performed by me. I agree with the assessment, plan and orders as documented by the fellow/resident, after I modified exam findings and plan of treatments, and the final version is my approved version of the assessment. Additional Comments: The patient presented with dizziness. No syncope. Happens while standing up or sitting down. Diagnosed to have ALS and followed by neurology. Recent complete work up reviewed. Check orthostatics. Follow on neurology recommendations. [facility]   Consultation Note               Today's Date: 6/20/2021  Patient Name: [address] [place] [address]  Date of admission: 6/19/2021  1:03 PM  Patient's age: 46 y.o., 1970  Admission Dx: Elevated troponin [R77.8]    Reason for Consult:  Troponin elevation    Requesting Physician: Essence Valles MD    CHIEF COMPLAINT:    Chief Complaint   Patient presents with    Dizziness       History Obtained From:  patient, electronic medical record    HISTORY OF PRESENT ILLNESS:      The patient is a 46 y.o.  female who was admitted to the hospital for progressively worsening dizziness. Her symptoms have been going on for last 6 months. She was recently diagnosed with ALS. She denies any active or recent chest pain, palpitations, orthopnea, leg swelling. Reports poor oral intake. She recently underwent a complete cardiac work-up with echocardiogram and nuclear stress test and eventually a cardiac catheterization all of which were unremarkable. EKG on presentation was unremarkable. Troponin with a flat trend. No active chest pain.         Past Medical History:   has a past medical history of Abnormal cardiovascular stress test, ALS (amyotrophic lateral sclerosis) ([facility] [address] GERD (gastroesophageal reflux disease), and Well adult health check. Past Surgical History:   has a past surgical history that includes  section and Cardiac catheterization (2021). Home Medications:    Prior to Admission medications    Medication Sig Start Date End Date Taking?  Authorizing Provider   riluzole (RILUTEK) 50 MG tablet Take 50 mg by mouth every 12 hours    Historical Provider, MD   omeprazole (PRILOSEC) 20 MG delayed release capsule Take 20 mg by mouth daily    Historical Provider, MD   Multiple Vitamins-Minerals (MULTIVITAMIN ADULT PO) Take by mouth    Historical Provider, MD   Cholecalciferol (VITAMIN D) 50 MCG (2000) TABS tablet Take 2,000 Units by mouth daily    Historical Provider, MD   ferrous sulfate 325 (65 Fe) MG tablet Take 325 mg by mouth every other day     Historical Provider, MD      Current Facility-Administered Medications: vitamin D (CHOLECALCIFEROL) tablet 2,000 Units, 2,000 Units, Oral, Daily  ferrous sulfate (FE TABS 325) EC tablet 325 mg, 325 mg, Oral, Every Other Day  multivitamin 1 tablet, 1 tablet, Oral, Daily  pantoprazole (PROTONIX) tablet 40 mg, 40 mg, Oral, QAM AC  riluzole (RILUTEK) tablet 50 mg, 50 mg, Oral, Q12H  sodium chloride flush 0.9 % injection 5-40 mL, 5-40 mL, Intravenous, 2 times per day  sodium chloride flush 0.9 % injection 10 mL, 10 mL, Intravenous, PRN  0.9 % sodium chloride infusion, 25 mL, Intravenous, PRN  potassium chloride (KLOR-CON M) extended release tablet 40 mEq, 40 mEq, Oral, PRN **OR** potassium bicarb-citric acid (EFFER-K) effervescent tablet 40 mEq, 40 mEq, Oral, PRN **OR** potassium chloride 10 mEq/100 mL IVPB (Peripheral Line), 10 mEq, Intravenous, PRN  magnesium sulfate 1000 mg in dextrose 5% 100 mL IVPB, 1,000 mg, Intravenous, PRN  enoxaparin (LOVENOX) injection 40 mg, 40 mg, Subcutaneous, Daily  ondansetron (ZOFRAN-ODT) disintegrating tablet 4 mg, 4 mg, Oral, Q8H PRN **OR** ondansetron (ZOFRAN) injection 4 mg, 4 mg, Intravenous, Q6H PRN  polyethylene glycol (GLYCOLAX) packet 17 g, 17 g, Oral, Daily PRN  acetaminophen (TYLENOL) tablet 650 mg, 650 mg, Oral, Q6H PRN **OR** acetaminophen (TYLENOL) suppository 650 mg, 650 mg, Rectal, Q6H PRN  sodium chloride flush 0.9 % injection 10 mL, 10 mL, Intravenous, PRN      Allergies:  Patient has no known allergies. Social History:   reports that she has never smoked. She has never used smokeless tobacco. She reports previous alcohol use. She reports that she does not use drugs. Family History: family history includes Cancer in her father; High Blood Pressure in her mother; Hypertension in her mother. No h/o sudden cardiac death. No for premature CAD      REVIEW OF SYSTEMS:    · Constitutional: there has been no unanticipated weight loss. · Eyes: No visual changes or diplopia. · ENT: No Headaches  · Cardiovascular: see above  · Respiratory: No previous pulmonary problems, No cough  · Gastrointestinal: No abdominal pain. No change in bowel or bladder habits. · Genitourinary: No dysuria, trouble voiding, or hematuria. · Musculoskeletal:  No gait disturbance, No weakness or joint complaints. · Integumentary: No rash or pruritis. · Neurological: No headache, diplopia      PHYSICAL EXAM:      BP (!) 89/58   Pulse 93   Temp 98.3 °F (36.8 °C) (Oral)   Resp 13   Ht 4' 9\" (1.448 m)   Wt 107 lb (48.5 kg)   LMP 06/16/2021   SpO2 100%   BMI 23.15 kg/m²    Constitutional and General Appearance: Alert, no distress  Respiratory:  · No increased work of breathing. · Clear to auscultation bilaterally. No wheeze or crackles. Cardiovascular:  · Normal S1 and S2.   · Jugular venous pulsation Normal  Abdomen:   · Soft  · No tenderness  Extremities:  · No lower extremity edema  Neurologic:  · Alert and oriented.   · Moves all extremities well      DATA:    Diagnostics:    Labs:     CBC:   Recent Labs     06/19/21  1339 06/20/21  0453   WBC 6.2 6.1   HGB 12.5 10.9*   HCT 42.8 38.5    252     BMP:   Recent Labs     06/19/21  1339 06/20/21  0453    141   K 4.4 3.9   CO2 33* 35*   BUN 9 8   CREATININE 0.44* 0.34*   LABGLOM >60 >60   GLUCOSE 121* 103*     BNP: No results for input(s): BNP in the last 72 hours. PT/INR:   Recent Labs     06/20/21  0453   PROTIME 10.8   INR 1.0     APTT:No results for input(s): APTT in the last 72 hours. CARDIAC ENZYMES:  Recent Labs     06/19/21  1339 06/19/21  1448 06/19/21  1847   TROPHS 75* 67* 64*     No results for input(s): CKTOTAL, CKMB, CKMBINDEX, TROPONINI in the last 72 hours. Invalid input(s):  [address]  Recent Labs     06/19/21  1339 06/19/21  1448 06/19/21  1847   TROPONINT NOT REPORTED NOT REPORTED NOT REPORTED     FASTING LIPID PANEL:  Lab Results   Component Value Date    HDL 45 10/26/2020    TRIG 76 07/27/2016     LIVER PROFILE:No results for input(s): AST, ALT, LABALBU in the last 72 hours. EKG: normal sinus rhythm, no acute ST changes    Echo: 5/6/21      Stress test: 5/5/21      CATH: 5/24/21  LMCA: Normal 0% stenosis. LAD: Normal 0% stenosis. LCx: Normal 0% stenosis. RCA: Normal 0% stenosis. Coronary Tree   Dominance: Right  EF 55%      IMPRESSION:    1. Elevated troponin - 75>67>64  2. Vertigo  3. ALS    Patient Active Problem List   Diagnosis    Elevated troponin    Vertigo    ALS [facility]amyotrophic lateral sclerosis) ([facility])       RECOMMENDATIONS:  1. EKG and troponin trend unremarkable for ACS. Recent echo unremarkable and cardiac cath with no obstructive coronary disease. 2. Symptoms of dizziness likely related to neurological disease. Check orthostatics. 3. No further inpatient work up from cardiology stand point. Will sign off. Thank you for allowing us to participate in [address] [place] [address]. Will follow with you.       Electronically signed on 06/20/21 at 6:43 AM by:    Arelis Matta MD, MD   Fellow, [facility]

## 2021-06-20 NOTE — PLAN OF CARE
Problem: Skin Integrity:  Goal: Will show no infection signs and symptoms  Description: Will show no infection signs and symptoms  Outcome: Ongoing  Goal: Absence of new skin breakdown  Description: Absence of new skin breakdown  Outcome: Ongoing     Problem: Falls - Risk of:  Goal: Will remain free from falls  Description: Will remain free from falls  Outcome: Ongoing  Goal: Absence of physical injury  Description: Absence of physical injury  Outcome: Ongoing     Problem: Mobility - Impaired:  Goal: Mobility will improve  Description: Mobility will improve  Outcome: Ongoing     Problem: Nutrition Deficit:  Goal: Ability to achieve adequate nutritional intake will improve  Description: Ability to achieve adequate nutritional intake will improve  Outcome: Ongoing     Problem: Cardiac:  Goal: Ability to maintain vital signs within normal range will improve  Description: Ability to maintain vital signs within normal range will improve  Outcome: Ongoing  Goal: Cardiovascular alteration will improve  Description: Cardiovascular alteration will improve  Outcome: Ongoing     Problem: Health Behavior:  Goal: Will modify at least one risk factor affecting health status  Description: Will modify at least one risk factor affecting health status  Outcome: Ongoing  Goal: Identification of resources available to assist in meeting health care needs will improve  Description: Identification of resources available to assist in meeting health care needs will improve  Outcome: Ongoing     Problem: Physical Regulation:  Goal: Complications related to the disease process, condition or treatment will be avoided or minimized  Description: Complications related to the disease process, condition or treatment will be avoided or minimized  Outcome: Ongoing

## 2021-06-20 NOTE — DISCHARGE SUMMARY
[facility]  Office: [address], DO, Uzma Donato, DO, Tameka Roselyn, DO, Del Eduardo Blood, DO, Lon Nunez MD, Rebekah Pulido MD, Bert Lucia MD, Mami Bray MD, Mary Mak MD, Malini Pimentel MD, Kamilah Gibson MD, Guera Brower MD, Madina Fuentes, DO, Alfred Hill MD, Rachel Prieto, DO, Nas Jackson MD,  Jesus Shaw, DO, Qasim Leonardo MD, Susanne Moreland MD, Christal Middleotn MD, Glenn Blanco MD, Belle Hooper, [facility], [facility], CNP, Alma Delia Jules, CNP, Oriana Rosa, CNS, Brian Madrigal, CNP, Bianca Donato, CNP, Esau Gannon, CNP, Britt Carlin, CNP, Bebo Sands, CNP, Mallika Gardiner PA-C, Norris Moreno, [facility], David Molina, CNP, Devan Pablo, CNP, Salma Cowart, CNP, Randell Ho, CNP, Vern Grullon, CNP, [facility], [address]    Discharge Summary     Patient ID: Sierra Reilly  :  1970   MRN: [id number]     ACCOUNT:  [de-identified]   Patient's PCP: Marquez Mcghee DO  Admit Date: 2021   Discharge Date: 2021      Length of Stay: 0  Code Status:  Full Code  Admitting Physician: Qasim Leonardo MD  Discharge Physician: Qasim Leonardo MD     Active Discharge Diagnoses:     Hospital Problem Lists:  Principal Problem:    Elevated troponin  Active Problems:    Vertigo    ALS (amyotrophic lateral sclerosis) ([facility] [address]  Resolved Problems:    * No resolved hospital problems. *      Admission Condition:  stable     Discharged Condition: stable  History of Present Illness:      Sierra Reilly is a 46 y.o. Non-/non  female who presents with Dizziness   and is admitted to the hospital for the management of Elevated troponin.     Pt is a 46year old female with medical history of ALS and GERD who presents to ED today with c/o dizziness x 6 months. She denies any chest pain or discomfort.  She states that she has been having these \"dizzy spells\" for approximately 6 mos, they

## 2021-06-20 NOTE — CARE COORDINATION
Discharge 1 [facility] Case Management Department  Written by: Cl Newberry RN    Patient Name: Adi Taylor  Attending Provider: Najma Mitchell MD  Admit Date: 2021  1:03 PM  MRN: [id number]  Account: [de-identified]                     : 1970  Discharge Date:  2021      Disposition: home with family    Cl Newberry RN

## 2021-06-23 NOTE — TELEPHONE ENCOUNTER
Valentina Wiseman called the office this afternoon to let us know that she had just spoke with [facility]. They told her they were going to \"expedite\" her financial papers to try to get her in ASAP. I asked that she keep our office informed or let us know if there was anything we could do.

## 2021-07-06 NOTE — TELEPHONE ENCOUNTER
Dr Kath Brizuela saw Corwin loving today in follow up. He asked me to call Dr. Tanesha Pyle office to see if they want to see Diogo Haskins in follow up or if she can be scheduled for her surgery. She was seen by Dr. Jennifer Funez and scheduled for surgery but had an abnormal EKG. She had to see cardiology and had a cardiac cath. and it apparently was okay and she is clear for surgery. I called Dr. Tanesha Pyle office and spoke with Medhat Dillon. She will check with Dr. Jennifer Funez and contact the pt. I left [place] know she should be hearing from their office in 1-2 days.

## 2021-07-06 NOTE — PROGRESS NOTES
Active problem amyotrophic lateral sclerosis with weakness with bilateral hand and tongue atrophy with dysarthria and dysphagia . The condition is MRI cervical spine C3-4 large disc extrusion with canal stenosis with cord compression . Disc osteophyte complex C3-4 , C5-6, C6-7 with mild canal stenosis. She saw Dr Buster Perez neurosurgery who wanted to proceed with surgical decompression . She had abnormal EKG seeing cardiologist having cardiac catheterization which was normal . She reports that since she was seen on March she is having more trouble with fine motor movement bilaterally . She has isabell off work sine May 28 having worked as  at [employer]. She had swallow study showing silent aspiration having modified diet of thickened liquids . Referral was made to [facility] [address] [place] [address] with insurance not covering reporting that clinic is working with her get coverage . She was placed on rilutek tolerating this well . She reports mild lightheadeness after taking rilutek tablet . There was prior positional vertigo going to ER that has attenuated . She reports initial weakness of left hand with muscle atrophy loosing muscle mass in beginning of 2020 with ongoing left had weakness dropping things with decrease finger dexterity with numbness of index finger . There has been development of weakness of right hand 6 months later although not as bad with some trouble with finger dexterity . She reports that her legs are weak feeling like hey have 50 lbs on them being harder to walk . There is limp in left leg with some dragging of leg. There has been change in her voice since March with slurring with trouble swallowing more liquids with initiating swallow. There are mild headaches over bifrontal head twice per week . There is neck pain going into shoulder grade 3 over 10  . Signifcant medications rilutek 50 mg po bid . Testing B12 754. TSG 5.11(0.3-5).  EMG/NCV with active and chronic reinnervation in all muscles selected in bilateral legs and right arm . MRI of Head normal . MRI cervical spine C3-4 large disc extrusion with canal stenosis with cord compression . Disc osteophyte complex C3-4 , C5-6, C6-7 with mild canal stenosis     Past Medical History:   Diagnosis Date    Abnormal cardiovascular stress test 2021    moderate mid and apical anterior ischemia    ALS (amyotrophic lateral sclerosis) ([address]     NEUROLOGIST - DR. Maira Rodriguez - LAST VISIT 3/2021 - [facility]ERR[facility] TO [facility]    GERD (gastroesophageal reflux disease)     Well adult health check     PCP DR Neelam Jeffery - 3/2021       Past Surgical History:   Procedure Laterality Date    CARDIAC CATHETERIZATION  2021     SECTION      x2       Family History   Problem Relation Age of Onset    High Blood Pressure Mother     Hypertension Mother     Cancer Father         throat cancer       Social History     Socioeconomic History    Marital status: Single     Spouse name: None    Number of children: None    Years of education: None    Highest education level: None   Occupational History    None   Tobacco Use    Smoking status: Never Smoker    Smokeless tobacco: Never Used   Vaping Use    Vaping Use: Never used   Substance and Sexual Activity    Alcohol use: Not Currently    Drug use: No    Sexual activity: Not Currently     Partners: Male   Other Topics Concern    None   Social History Narrative    None     Social Determinants of Health     Financial Resource Strain: Low Risk     Difficulty of Paying Living Expenses: Not hard at all   Food Insecurity: No Food Insecurity    Worried About Running Out of Food in the Last Year: Never true    Jayson of Food in the Last Year: Never true   Transportation Needs: No Transportation Needs    Lack of Transportation (Medical): No    Lack of Transportation (Non-Medical):  No   Physical Activity:     Days of Exercise per Week:     Minutes of Exercise per Session:    Stress:  Feeling of Stress :    Social Connections:     Frequency of Communication with Friends and Family:     Frequency of Social Gatherings with Friends and Family:     Attends [employer] Services:     Active Member of Clubs or Organizations:     Attends Club or Organization Meetings:     Marital Status:    Intimate Partner Violence:     Fear of Current or Ex-Partner:     Emotionally Abused:     Physically Abused:     Sexually Abused:        Current Outpatient Medications   Medication Sig Dispense Refill    aspirin 81 MG EC tablet Take 81 mg by mouth daily      meclizine (ANTIVERT) 12.5 MG tablet Take 12.5 mg by mouth 3 times daily as needed      riluzole (RILUTEK) 50 MG tablet Take 50 mg by mouth every 12 hours      omeprazole (PRILOSEC) 20 MG delayed release capsule Take 20 mg by mouth daily      Multiple Vitamins-Minerals (MULTIVITAMIN ADULT PO) Take by mouth      Cholecalciferol (VITAMIN D) 50 MCG (2000 UT) TABS tablet Take 2,000 Units by mouth daily      ferrous sulfate 325 (65 Fe) MG tablet Take 325 mg by mouth every other day        No current facility-administered medications for this visit. No Known Allergies      Review of Systems     Vitals:    07/06/21 0941   BP: 111/79   Pulse: 120     weight: 100 lb 3.2 oz (45.5 kg)      Review of Systems   Constitutional: Negative. HENT: Negative. Eyes: Negative. Respiratory: Negative. Cardiovascular: Negative. Gastrointestinal: Negative. Endocrine: Negative. Genitourinary: Negative. Musculoskeletal: Negative. Skin: Negative. Allergic/Immunologic: Negative. Neurological: Negative. Hematological: Negative. Psychiatric/Behavioral: Negative. Neurological Examination  Constitutional .General exam well groomed   Head/Ears /Nose/Throat: external ear . Normal exam  Neck and thyroid . Normal size. No bruits  Respiratory . Breathsounds clear bilaterally  Cardiovascular:  Auscultation of heart with regular rate and rhythm  Musculoskeletal. Muscle atrophy dorsal interosseous muscles bilaterally                                                            Muscle strength deltoids 4+/5 , biceps4+/5 , WE 4+/5 . Iliopsoas 4-/5 , hastrings 4/5 , ADF and AE 4-/5 otherwise 5/5 strength                                                                              No dysmetria or dysdiadokinesis  No tremor   Decrease fine motor bilaterally  Gait right steppage gait  Orientation Alert and oriented x 3   Attention and concentration normal  Short term memory normal  Language process and speech normal . No aphasia   Cranial nerve 2 normal acuety and visual fields  Cranial nerve 3, 4 and 6 . Extraocular muscles are intact . Pupils are equal and reactive   Cranial nerve 5 .. Intact corneal reflex. Normal facial sensation  Cranial nerve 7 normal exam   Cranial nerve 8. Grossly intact hearing   Cranial nerve 9 and 10. Symmetric palate elevation   Cranial nerve 11 , 5 out of 5 strength   Cranial Nerve 12 midline tongue . Atrophy with fasciculations  Sensation . Normal pinprick and light touch   Deep Tendon Reflexes brisk  Plantar response equivocal bilaterally      ASSESSMENT/PLAN      Diagnosis Orders   1. ALS (amyotrophic lateral sclerosis) ([facility]     2. Cervical disc herniation  Leana Faustin DO, Neurosurgery, [facility]   3. Generalized weakness     4.  Dysphagia, unspecified type     Will have my office contact neurosurgery to synchronize surgical decompression to continue current medication regimen     Orders Placed This Encounter   Procedures   [address] [facility] DO Areli, Neurosurgery, [facility]     Referral Priority:   Routine     Referral Type:   Eval and Treat     Referred to Provider:   Alem Phelps DO     Requested Specialty:   Neurosurgery     Number of Visits Requested:   1

## 2021-07-06 NOTE — TELEPHONE ENCOUNTER
Pt would like to know if she can  Be scheduled  her surgery. She had a past abnormal EKG and is now cleared by cardiology.

## 2021-07-12 NOTE — TELEPHONE ENCOUNTER
Dr Aliyah Villanueva would you like to see patient again before scheduling (last seen in March) or just schedule her for surgery?

## 2021-07-19 PROBLEM — R77.8 ELEVATED TROPONIN: Status: RESOLVED | Noted: 2021-01-01 | Resolved: 2021-01-01

## 2021-07-19 NOTE — PROGRESS NOTES
Department of Neurosurgery                                                      Follow up visit      History Obtained From:  patient    CHIEF COMPLAINT:         Chief Complaint   Patient presents with    Follow-up     discuss surgery       HISTORY OF PRESENT ILLNESS:       The patient is a 46 y.o. female who presents for follow up for cervical myelopathy and ALS. She had already consented to surgery and her last visit however it was delayed due to cardiac issues. To summarize patient, continues report balance issues when ambulating. Stable arm weakness. Patient's speech swallowing has not worsened since last visit.      PAST MEDICAL HISTORY :       Past Medical History:        Diagnosis Date    Abnormal cardiovascular stress test 2021    moderate mid and apical anterior ischemia    ALS (amyotrophic lateral sclerosis) ([facility]     NEUROLOGIST - DR. Mary Jo Michelle - LAST VISIT 3/2021 - REFERRED TO [facility]    GERD (gastroesophageal reflux disease)     Well adult health check     PCP DR Kendell Rosales - 3/2021       Past Surgical History:        Procedure Laterality Date    CARDIAC CATHETERIZATION  2021     SECTION      x2       Social History:   Social History     Socioeconomic History    Marital status: Single     Spouse name: Not on file    Number of children: Not on file    Years of education: Not on file    Highest education level: Not on file   Occupational History    Not on file   Tobacco Use    Smoking status: Never Smoker    Smokeless tobacco: Never Used   Vaping Use    Vaping Use: Never used   Substance and Sexual Activity    Alcohol use: Not Currently    Drug use: No    Sexual activity: Not Currently     Partners: Male   Other Topics Concern    Not on file   Social History Narrative    Not on file     Social Determinants of Health     Financial Resource Strain:     Difficulty of Paying Living Expenses:    Food Insecurity:     Worried About Running Out of Food in the Last Year:    [address] in the Last Year:    Transportation Needs:     Lack of Transportation (Medical):  Lack of Transportation (Non-Medical):    Physical Activity:     Days of Exercise per Week:     Minutes of Exercise per Session:    Stress:     Feeling of Stress :    Social Connections:     Frequency of Communication with Friends and Family:     Frequency of Social Gatherings with Friends and Family:     Attends [employer] Services:     Active Member of Clubs or Organizations:     Attends Club or Organization Meetings:     Marital Status:    Intimate Partner Violence:     Fear of Current or Ex-Partner:     Emotionally Abused:     Physically Abused:     Sexually Abused:        Family History:       Problem Relation Age of Onset    High Blood Pressure Mother     Hypertension Mother     Cancer Father         throat cancer       Allergies:  Patient has no known allergies. Home Medications:  Prior to Admission medications    Medication Sig Start Date End Date Taking? Authorizing Provider   aspirin 81 MG EC tablet Take 81 mg by mouth daily   Yes Historical Provider, MD   meclizine (ANTIVERT) 12.5 MG tablet Take 12.5 mg by mouth 3 times daily as needed   Yes Historical Provider, MD   riluzole (RILUTEK) 50 MG tablet Take 50 mg by mouth every 12 hours   Yes Historical Provider, MD   omeprazole (PRILOSEC) 20 MG delayed release capsule Take 20 mg by mouth daily   Yes Historical Provider, MD   Multiple Vitamins-Minerals (MULTIVITAMIN ADULT PO) Take by mouth   Yes Historical Provider, MD   Cholecalciferol (VITAMIN D) 50 MCG (2000 UT) TABS tablet Take 2,000 Units by mouth daily   Yes Historical Provider, MD   ferrous sulfate 325 (65 Fe) MG tablet Take 325 mg by mouth every other day    Yes Historical Provider, MD       Current Medications:   No current facility-administered medications for this visit.       PHYSICAL EXAM:       /65   Pulse 125   Ht 5' 1\" (1.549 m)   Wt 100 lb (45.4 kg)   SpO2 93%   BMI 18.89 kg/m²   Physical Exam     Gen: NAD  HEENT: moist mucus membranes  Cardio: RRR  Pulm: chest rise symmetrically  GI: abd soft  Ext: no edema  Skin: warm    Neuro:    AOX3  CN 2-12 grossly intact  Speech articulate  Motor 5/5  No pronator drift  Sensation symmetrical   Hands diffused atrophy interosseous muscles  Inner osseous strength 4/5  Bilateral clawing of hands  Right wills sign positive  Left wrist extension 4/5  Bilateral dorsiflexion 3/5  dtr 3+ on patella  dtr 2+ upper extremity  Steppage gait        Radiology Review:  None new      ASSESSMENT AND PLAN:       Patient Active Problem List   Diagnosis    Elevated troponin    Dizziness    ALS (amyotrophic lateral sclerosis) (HCC)         A/P:  This is a 46 y.o. female with cervical myelopathy and ALS  Plan to proceed with ACDF/possible corpectomy as originally scheduled    Patient and/or family was counseled on the diagnosis and treatment plan    Max Fishman DO     Board Certified Neurosurgeon  7/19/2021  2:38 PM      This note was created using voice recognition software. There may be inaccuracies of transcription  that are inadvertently overlooked prior to the signature. There is any questions about the transcription please contact me.

## 2021-08-19 NOTE — TELEPHONE ENCOUNTER
Dr Octavio Boston from [facility] called in today. He saw Celine Pantoja and he agrees with your diagnosis of ALS. He is sorry to advise that she only has 20% lung function and they have nothing further to offer her there at [address] [place] [address]. He did speak with her about hospice but she preferred to think about it and he said this would be better arranged from here since she lives in [facility]. I thanked him for calling to update Dr. Asim Salazar. I did call Dr. Bonds Sports office neurosurgery to update them of 's consult as she has cervical spine surgery scheduled.

## 2021-08-21 PROBLEM — G12.21 AMYOTROPHIC LATERAL SCLEROSIS (ALS) (HCC): Status: ACTIVE | Noted: 2021-01-01

## 2021-08-21 PROBLEM — J96.20 ACUTE ON CHRONIC RESPIRATORY FAILURE (HCC): Status: ACTIVE | Noted: 2021-01-01

## 2021-08-21 NOTE — FLOWSHEET NOTE
[address] [facility]   Patient Death Note  DEATH                  Room #    Name: Maurilio Browning            Age: 46 y.o. Gender: female          Restoration: Other      Place of Sabianism: unknown  Admit Date & Time: 2021 12:05 AM        Actual date of death: 2021 11:05 AM      Referral:  Unit: MedSur  Nurse: Celina Acharya    SITUATION AT DEATH:  Patient presented to ED overnight with breathing difficulties. Patient had ALS and succumbed to her illness at 11:05 AM. Patient was pronounced by Dr. Evangelina Lombardi? No    SPIRITUAL/EMOTIONAL INTERVENTION:  Patient  with two of her sons present. Writer spoke to the family, provided emotional support, and offered a prayer of commendation. Family is grieving but appears to be coping appropriately. Other family members arrived and writer provides emotional support. Writer assisted with the completion of the Expiration Summary/Release of Body form. Writer contacted [address] [place] [address] to  the body at 1:15 pm  and delivered the Expiration Summary/Release of Body form to the ED registration desk. DOCTOR SIGNING DEATH CERTIFICATE:  Name: Dr. Kim Lemons  Phone number: [phone]    Copy of COMPLETED Release of Body Form Received? Yes    Patient's belongings: With family     HOME:  Contacted Time 1:15 pm  Name: [employer] [facility]: [facility]  Phone Number: [phone] [place])    NEXT OF KIN:  Name: Gerald Renee   Relationship: Son  Street Address: [address] [place] [address],[address]: [place]: [place]  Zip code: [address]  Phone Number: [phone]    [facility]?   No    IF SO, WHAT?  none    Electronically signed by Jordy Kraus on 2021 at 12:33 PM.  Malathi  [phone]

## 2021-08-21 NOTE — ED PROVIDER NOTES
[facility]  Emergency Medicine Department    Pt Name: Melisa Rosales  MRN: [id number]  [place] 1970  Date of evaluation: 8/20/2021  Provider: Elisabet Tyson MD    CHIEF COMPLAINT     Chief Complaint   Patient presents with    Fatigue    Shortness of Breath       HISTORY OF PRESENT ILLNESS  (Location/Symptom, Timing/Onset, Context/Setting,Quality, Duration, Modifying Factors, Severity.)   Melisa Rosales is a 46 y.o. female who presents to the emergency department due to altered mental status and difficulty breathing. She has a history of ALS. Family reports she is still able to walk around and communicating though it is difficult to understand what she is saying. Family reports she appears to be having difficulty breathing more so than usual.  They stated she does not want to be intubated but they are willing to consider a trial of BiPAP. History from the patient is limited secondary to her communication deficits. Nursing Notes were reviewed. ALLERGIES     Patient has no known allergies.     CURRENT MEDICATIONS       Discharge Medication List as of 8/21/2021  4:07 PM      CONTINUE these medications which have NOT CHANGED    Details   aspirin 81 MG EC tablet Take 81 mg by mouth dailyHistorical Med      meclizine (ANTIVERT) 12.5 MG tablet Take 12.5 mg by mouth 3 times daily as neededHistorical Med      riluzole (RILUTEK) 50 MG tablet Take 50 mg by mouth every 12 hoursHistorical Med      omeprazole (PRILOSEC) 20 MG delayed release capsule Take 20 mg by mouth dailyHistorical Med      Multiple Vitamins-Minerals (MULTIVITAMIN ADULT PO) Take by mouthHistorical Med      Cholecalciferol (VITAMIN D) 50 MCG (2000 UT) TABS tablet Take 2,000 Units by mouth dailyHistorical Med      ferrous sulfate 325 (65 Fe) MG tablet Take 325 mg by mouth every other day Historical Med             PAST MEDICAL HISTORY         Diagnosis Date    Abnormal cardiovascular stress test 05/2021    moderate mid and apical anterior ischemia    ALS (amyotrophic lateral [facility]) ([facility]     NEUROLOGIST - DR. Ashli Alvarado - LAST VISIT 3/2021 - REFERRED TO [facility]    GERD (gastroesophageal reflux disease)     Well adult health check     PCP DR Rebecca Dominguez - 3/2021       SURGICAL HISTORY           Procedure Laterality Date    CARDIAC CATHETERIZATION  2021     SECTION      x2       FAMILY HISTORY           Problem Relation Age of Onset    High Blood Pressure Mother     Hypertension Mother     Cancer Father         throat cancer     Family Status   Relation Name Status    Mother  Alive    Father          SOCIAL HISTORY      reports that she has never smoked. She has never used smokeless tobacco. She reports previous alcohol use. She reports that she does not use drugs. REVIEW OF SYSTEMS    (2-9 systems for level 4, 10 or more for level 5)     Review of Systems   Unable to perform ROS: Acuity of condition       PHYSICAL EXAM    (up to 7 for level 4, 8 or more for level 5)     ED Triage Vitals   BP Temp Temp Source Pulse Resp SpO2 Height Weight   21 0015 21 0144 21 0144 21 0015 21 0015 21 0015 21 0015 21 0015   (!) 148/87 97.2 °F (36.2 °C) Axillary 119 26 94 % 5' 4\" (1.626 m) 100 lb (45.4 kg)     Physical Exam  Vitals and nursing note reviewed. Constitutional:       Comments: Minimally responsive   HENT:      Head: Normocephalic and atraumatic. Eyes:      Pupils: Pupils are equal, round, and reactive to light. Cardiovascular:      Rate and Rhythm: Regular rhythm. Tachycardia present. Heart sounds: Normal heart sounds. Pulmonary:      Breath sounds: No decreased breath sounds. Comments: Minimal respiratory effort  Abdominal:      Palpations: Abdomen is soft. Tenderness: There is no abdominal tenderness. There is no guarding. Musculoskeletal:         General: No tenderness or deformity. Cervical back: Neck supple. Skin:     General: Skin is warm and dry. Psychiatric:         Thought Content: Thought content normal.         Judgment: Judgment normal.         DIAGNOSTIC RESULTS     EKG:All EKG's are interpreted by the Emergency Department Physician who either signs or Co-signs this chart in the absence of a cardiologist.    Interpretation: Sinus tachycardia with a rate of 119. Left Axis Deviation. Normal intervals. Occasional PVC. No ST elevations. RADIOLOGY:   Non-plain film images such as CT, Ultrasound and MRI are read by theradiologist. Plain radiographic images are visualized and preliminarily interpreted by the emergency physician with the below findings:    Interpretation per the Radiologist below, if available at the time of this note:    XR CHEST PORTABLE   Final Result   Normal examination. ED BEDSIDE ULTRASOUND:   Performed by ED Physician - none    LABS:  Labs Reviewed   CBC WITH AUTO DIFFERENTIAL - Abnormal; Notable for the following components:       Result Value    WBC 12.1 (*)     RBC 5.77 (*)     MCV 80.9 (*)     MCH 22.0 (*)     MCHC 27.2 (*)     RDW 15.9 (*)     Seg Neutrophils 78 (*)     Lymphocytes 8 (*)     Monocytes 13 (*)     Eosinophils % 0 (*)     Immature Granulocytes 1 (*)     Segs Absolute 9.44 (*)     Absolute Lymph # 0.97 (*)     Absolute Mono # 1.57 (*)     All other components within normal limits   COMPREHENSIVE METABOLIC PANEL W/ REFLEX TO MG FOR LOW K - Abnormal; Notable for the following components:    Glucose 184 (*)     BUN 21 (*)     CREATININE <0.40 (*)     Chloride 95 (*)     CO2 42 (*)     Anion Gap 6 (*)     Total Bilirubin 0.20 (*)     All other components within normal limits   LACTIC ACID       All other labs were within normal range or not returned as of this dictation.     EMERGENCYDEPARTMENT COURSE and DIFFERENTIAL DIAGNOSIS/MDM:   Vitals:    Vitals:    08/21/21 0644 08/21/21 0800 08/21/21 0816 08/21/21 0915   BP:   (!) 72/46 (!) 93/50   Pulse: 111  112 187

## 2021-08-21 NOTE — RT PROTOCOL NOTE
RT Inhaler-Nebulizer Bronchodilator Protocol Note    There is a bronchodilator order in the chart from a provider indicating to follow the RT Bronchodilator Protocol and there is an Initiate RT Bronchodilator Protocol order as well (see protocol at bottom of note). The findings from the last RT Protocol Assessment were as follows:  Smoking: Non smoker  Surgical Status: No surgery  Xray: Clear  Respiratory Pattern: RR 12-20  Mental Status: Lethargic or unable to follow commands  Breath Sounds: Clear  Cough: Absent  Activity Level: Bedridden, unresponsive or quadriplegic  Oxygen Requirement: 41% or 6LNC - 60%  Indication for Bronchodilator Therapy:    Bronchodilator Assessment Score: 4    Aerosolized bronchodilator medication orders have been revised according to the RT Bronchodilator Protocol. RT Inhaler-Nebulizer Bronchodilator Protocol:    Respiratory Therapist to perform RT Therapy Protocol Assessment then follow the protocol. No Indications - adjust the frequency to every 6 hours PRN wheezing or bronchospasm, if no treatments needed after 48 hours then discontinue using Per Protocol order mode. If indication present, adjust the RT bronchodilator orders based on the Bronchodilator Assessment Score as follows:    0-6 - enter or revise RT bronchodilator order to Albuterol Inhaler order with frequency of every 2 hours PRN for wheezing or increased work of breathing using Per Protocol order mode. If Albuterol Inhaler not tolerated or not effective, then discontinue the Albuterol Inhaler order and enter Albuterol Nebulizer order with same frequency and PRN reasons. Repeat RT Therapy Protocol Assessment as needed. 7-10 - discontinue any other Inpatient aerosolized bronchodilator medication orders and enter or revise two Albuterol Inhaler orders, one with BID frequency and one with frequency of every 2 hours PRN wheezing or increased work of breathing using Per Protocol order mode.   Repeat RT Therapy Protocol Assessment with second treatment then BID and as needed. If Albuterol Inhaler not tolerated or not effective, then discontinue the Albuterol Inhaler orders and enter two Albuterol Nebulizer orders with same frequencies and PRN reasons. 11-13 - discontinue any other Inpatient aerosolized bronchodilator medication orders and enter DuoNeb Nebulizer orders QID frequency and an Albuterol Nebulizer order every 2 hours PRN wheezing or increased work of breathing using Per Protocol order mode. Repeat RT Therapy Protocol Assessment with second treatment then QID and as needed. Greater than 13 - discontinue any other Inpatient bronchodilator aerosolized medication orders and enter DuoNeb Nebulizer order every 4 hours frequency and Albuterol Nebulizer every 2 hours PRN wheezing or increased work of breathing using Per Protocol order mode. Repeat RT Therapy Protocol Assessment with second treatment then every 4 hours and as needed. RT to enter RT Home Evaluation for COPD & MDI Assessment order using Per Protocol order mode.     Electronically signed by francesca acosta RCP on 8/21/2021 at 9:35 AM

## 2021-08-21 NOTE — PROGRESS NOTES
Writer went in room to check on patient and listen for heart rate. No heart rate was able to be heard. Lead nurse Debo Nelson also listened and did not hear heart rate. Dr. Garrett Johnson and  notified. Family was present and was told that no heartbeat was present. Family is calm and accepting of the situation at this time.

## 2021-08-21 NOTE — PROGRESS NOTES
Patient admitted from ED on [facility] code status. Report given over the phone. Patient is currently unresponsive and under comfort care only. Family is at bedside. PCA pump is infusing.

## 2021-08-21 NOTE — ED NOTES
[facility] notified of pt and pt status.  Referral #[id number]     Geno Garner RN  08/21/21 9000 new dx to me, chronic problem, has been issues on/off for years, reviewed treatment options with him, continue to follow up with specialist, reviewed life style changes to help improve mood, reviewed role of daily vs prn meds, following changes were made today: will add in Sertraline. Reviewed SSRI/SNRI options. Reviewed expectations and he will update me in 4-6 weeks with an update.

## 2021-08-21 NOTE — CARE COORDINATION
Discharge Planning:    Writer contacted [facility] and spoke with Nohemi/RN to inform them that the patient has . Received call from Saray/VOLODYMYR with [facility] in intake and made her aware as well.

## 2021-08-21 NOTE — PROGRESS NOTES
Patient's body was tagged and placed in bag. Body picked up and transported out of hospital by morgue staff and hospital security.

## 2021-08-21 NOTE — DISCHARGE SUMMARY
[facility]  Office: [address], DO, Leveljaylin Montezbs, DO, Anjum Mullen, DO, Mianrosa Winston Blood, DO, Darnell Herzog MD, Jacqueline Gavin MD, Kody Paulino MD, Reji Bradshaw MD, Te Patino MD, Verna Damico MD, Wiliam Burnham MD, Rai Mcknight, DO, Ca Trevino MD, Viktoria Perdomo, DO, Ladonna Leonard MD,  Amelia Cardona, DO, Crystal Barragan MD, Adis Morrissey MD, Raenette Felty, MD, Magdaleno Rehman MD, Blanche Ugarte MD, Omar Noel MD, Nica Johnson MD, Lila Babcock [facility], [facility] [place], CNP, Tawanna Johnson, CNP, Laura Resendez, CNS, John George, CNP, Liane Peña, CNP, Ana Goldstein, CNP, Dhaval Armstrong, CNP, Marilin Mo, CNP, Rayshawn Salgado PA-C, Tamar Moreno, [facility], Cm Kim, CNP, Betito Schwarz, CNP, Essence Dorado, CNP, Meggan Fisher, CNP, Emanuel Walker, CNP, Nimco Muller, CNP, Kerri Arambula, CNP, Corbin Jeong, [facility]    Discharge Summary     Patient ID: Meron Ballard  :  1970   MRN: [id number]     ACCOUNT:  [de-identified]   Patient's PCP: Ishan Bustamante DO  Admit Date: 2021   Discharge Date: 2021     Length of Stay: 0  Code Status:  DNR-CC  Admitting Physician: Adis Morrissey MD  Discharge Physician: Gavin Abarca DO     Active Discharge Diagnoses:     Hospital Problem Lists:  Principal Problem:    Acute on chronic respiratory failure [facility])  Active Problems:    Amyotrophic lateral sclerosis (ALS) ([facility]    GERD (gastroesophageal reflux disease)  Resolved Problems:    * No resolved hospital problems.  *      Admission Condition:  poor     Discharged Condition: [address] Stay:     Hospital Course:  Meron Ballard is a 46 y.o. female who was admitted for the management of  Acute on chronic respiratory failure ([address] , presented to ER with Fatigue and Shortness of Breath    This is a 70-year-old female with history of ALS that presents with fatigue and shortness of breath and Requiring Further Evaluation/Follow Up POST HOSPITALIZATION/Incidental Findings: NA    Diet: NA    Activity:     Instructions to Patient: NA    Discharge Medications:      Medication List      ASK your doctor about these medications    aspirin 81 MG EC tablet     ferrous sulfate 325 (65 Fe) MG tablet  Commonly known as: IRON 325     meclizine 12.5 MG tablet  Commonly known as: ANTIVERT     MULTIVITAMIN ADULT PO     omeprazole 20 MG delayed release capsule  Commonly known as: PRILOSEC     Rilutek 50 MG tablet  Generic drug: riluzole     vitamin D 50 MCG (2000 UT) Tabs tablet  Commonly known as: CHOLECALCIFEROL            No discharge procedures on file. Time Spent on discharge is  20 mins in patient examination, evaluation, counseling as well as medication reconciliation, prescriptions for required medications, discharge plan and follow up. Electronically signed by   Tunde Almodovar DO  8/21/2021  11:52 AM      Thank you Dr. Arleth Tena DO for the opportunity to be involved in this patient's care.

## 2021-08-21 NOTE — ED NOTES
Pt arrives via ambulance who were called to the home by family for an unresponsive person. First responders found pt awake with slow speech and pulse ox in 60s. Pt has ALS. Pt arrives alert with slow responses (family states is normal for pt), quickly drops to 60% on RA and with minimal respiratory effort. Pt placed on 6L NC, improves to 88%. Moved pt to room 22, respiratory called to bedside. Pt verbalizes to me she does not wish to go on a ventilator. Family is at the bedside at this time. Family states pt was her normal self yesterday, visited this evening and felt as though she may be dehydrated and looked fatigued. Family gave her one of her protein shakes when she suddenly became increasingly fatigued. Respiratory attempts BiPAP though pt does not tolerate then refuses.  Cont to monitor on [facility]  08/21/21 7880

## 2021-08-21 NOTE — H&P
[facility]  Office: [address], DO, Ryanneda Hurtado, DO, Bia Warner, DO, Barbara Jackson Blood, DO, Huseyin Benavidez MD, Polo Fuentes MD, Leona Simon MD, Cheri Oneal MD, Matt Sandoval MD, Jade Ku MD, Kaye Hernandez MD, Cindy Bone, DO, David Morales MD, Marcelo Harris DO, Lefty Weiss MD,  Glenn Vines DO, Dinorah Ch MD, Felix Horowitz MD, Cookie Phillip MD, Iwona Nettles MD, Skinny Lipscomb MD, Rosalinda Pace MD, Kitty Degroot MD, Josi Samaniego, [facility], [facility], CNP, Cesario Olmedo, CNP, Zeeshan Schulte, CNS, Glendy Santa, CNP, Hermila Mayer, CNP, Maria Elena Ramirez, CNP, Claudia Das, CNP, Cherelle Prieto, CNP, Bandar Johnson PA-C, Jared Dent, [facility], Tito Alva, CNP, Mitch Bledsoe, CNP, Fredis Lopez, CNP, Cm Jeong, CNP, Omar Slater, CNP, Saumya Nava, CNP, Carmen Pickett, CNP, Jenny Dillon, CNP         [facility]    HISTORY AND PHYSICAL EXAMINATION            Date:   8/21/2021  Patient name:  Uma Soto  Date of admission:  8/21/2021 12:05 AM  MRN:   [id number]  Account:  [de-identified]  YOB: 1970  PCP:    Lyndsay Carranza DO  Room:   [place] [address]  Code Status:    DNR-CC    Chief Complaint:     Chief Complaint   Patient presents with    Fatigue    Shortness of Breath       History Obtained From:     family member, electronic medical record    History of Present Illness:     Uma Soto is a 46 y.o. Non- / non  female who presents with Fatigue and Shortness of Breath   and is admitted to the hospital for the management of Acute on chronic respiratory failure ([facility] 68-year-old female presented to the emergency room via squad due to decreased responsiveness according to family. When the paramedics arrived she was awake with slow speech. Her pulse ox was in the 60s on room air. She was placed on 6 L and sats improved to 88%.   She was diagnosed with ALS in February of this year after having several months of generalized weakness, numbness, fatigue, anorexia and weight loss. She sees Dr. Funmilayo Doyle from neurology. Family at the bedside states that a family member took the patient to the [facility] clinic yesterday for second opinion regarding ALS which was confirmed. Family states that she was acting like her normal self yesterday. At baseline she is able to walk short distances on her own she can feed herself small meals but is mostly drinking protein shakes. She is normally responsive and able to speak. Less than 24 hours later family went to check on her today and she was minimally responsive and appeared \"dehydrated\". She eats very little but that has not changed recently. She has not had any recent cold symptoms, fevers or coughs. According to records from [facility] clinic yesterday diagnosis of ALS was confirmed. It is stated that she only has 20% lung function and discussed hospice with patient and family. Family wanted to wait until she returned home to [facility] to discuss those plans. She was made a DNR CC in the emergency room tonight with the ER doc. She had documented oxygen saturation level at 25% in the emergency room tonight. Chest x-ray was read as normal. WBC 12.1 BUN 21, cre <0.40, CO2 42, anion gap 6.  Normal lactic acid         Past Medical History:     Past Medical History:   Diagnosis Date    Abnormal cardiovascular stress test 2021    moderate mid and apical anterior ischemia    ALS (amyotrophic lateral sclerosis) ([address]     NEUROLOGIST - DR. Imer Miranda - LAST VISIT 3/2021 - REFERRED TO [facility]    GERD (gastroesophageal reflux disease)     Well adult health check     PCP DR Kate Bosch - 3/2021        Past Surgical History:     Past Surgical History:   Procedure Laterality Date    CARDIAC CATHETERIZATION  2021     SECTION      x2        Medications Prior to Admission:     Prior to Admission medications    Medication Sig Start Date End Date Taking? Authorizing Provider   aspirin 81 MG EC tablet Take 81 mg by mouth daily    Historical Provider, MD   meclizine (ANTIVERT) 12.5 MG tablet Take 12.5 mg by mouth 3 times daily as needed    Historical Provider, MD   riluzole (RILUTEK) 50 MG tablet Take 50 mg by mouth every 12 hours    Historical Provider, MD   omeprazole (PRILOSEC) 20 MG delayed release capsule Take 20 mg by mouth daily    Historical Provider, MD   Multiple Vitamins-Minerals (MULTIVITAMIN ADULT PO) Take by mouth    Historical Provider, MD   Cholecalciferol (VITAMIN D) 50 MCG ( UT) TABS tablet Take 2,000 Units by mouth daily    Historical Provider, MD   ferrous sulfate 325 (65 Fe) MG tablet Take 325 mg by mouth every other day     Historical Provider, MD        Allergies:     Patient has no known allergies. Social History:     Tobacco:    reports that she has never smoked. She has never used smokeless tobacco.  Alcohol:      reports previous alcohol use. Drug Use:  reports no history of drug use. Family History:     Family History   Problem Relation Age of Onset    High Blood Pressure Mother     Hypertension Mother     Cancer Father         throat cancer       Review of Systems:     Positive and Negative as described in HPI. Review of Systems   Unable to perform ROS: Patient unresponsive       Physical Exam:   /72   Pulse 111   Temp 97.2 °F (36.2 °C) (Axillary)   Resp 8   Ht 5' 4\" (1.626 m)   Wt 100 lb (45.4 kg)   SpO2 (!) 85%   BMI 17.16 kg/m²   Temp (24hrs), Av.2 °F (36.2 °C), Min:97.2 °F (36.2 °C), Max:97.2 °F (36.2 °C)    No results for input(s): POCGLU in the last 72 hours. No intake or output data in the 24 hours ending 21 0704    Physical Exam  Constitutional:       Appearance: She is cachectic. She is ill-appearing. HENT:      Right Ear: External ear normal.      Left Ear: External ear normal.      Mouth/Throat:      Mouth: Mucous membranes are dry.    Eyes:      General: Value Ref Range    Glucose 184 (H) 70 - 99 mg/dL    BUN 21 (H) 6 - 20 mg/dL    CREATININE <0.40 (L) 0.50 - 0.90 mg/dL    Bun/Cre Ratio CANNOT BE CALCULATED 9 - 20    Calcium 10.0 8.6 - 10.4 mg/dL    Sodium 143 135 - 144 mmol/L    Potassium 4.8 3.7 - 5.3 mmol/L    Chloride 95 (L) 98 - 107 mmol/L    CO2 42 (HH) 20 - 31 mmol/L    Anion Gap 6 (L) 9 - 17 mmol/L    Alkaline Phosphatase 56 35 - 104 U/L    ALT 23 5 - 33 U/L    AST 28 <32 U/L    Total Bilirubin 0.20 (L) 0.3 - 1.2 mg/dL    Total Protein 7.7 6.4 - 8.3 g/dL    Albumin 4.3 3.5 - 5.2 g/dL    Albumin/Globulin Ratio NOT REPORTED 1.0 - 2.5    GFR Non- CANNOT BE CALCULATED >60 mL/min    GFR  CANNOT BE CALCULATED >60 mL/min    GFR Comment          GFR Staging NOT REPORTED    Lactic Acid    Collection Time: 08/21/21 12:01 AM   Result Value Ref Range    Lactic Acid 1.2 0.5 - 2.2 mmol/L       Imaging/Diagnostics:  XR CHEST PORTABLE    Result Date: 8/21/2021  Normal examination. Assessment :      Hospital Problems         Last Modified POA    * (Principal) Acute on chronic respiratory failure ([facility] 8/21/2021 Yes    Amyotrophic lateral sclerosis (ALS) ([facility] 8/21/2021 Yes          Plan:     Patient status inpatient in the  Med/Surge     [facility]   Morphine drip   Hospice consult        Consultations:   IP CONSULT TO [address] TO [facility]    Patient is admitted as inpatient status because of co-morbidities listed above, severity of signs and symptoms as outlined, requirement for current medical therapies and most importantly because of direct risk to patient if care not provided in a hospital setting. Expected length of stay > 48 hours.     JESSICA CLAUDIO CNP  8/21/2021  7:04 AM    Copy sent to Dr. Sukhwinder Jansen DO

## 2021-08-21 NOTE — CARE COORDINATION
Discharge Planning:    Writer down to patient's room to discuss hospice provider choice. Patient's son, TALITA GALLOWAY, informs writer that he would like referral sent to [facility]. Writer contacted [facility] at [phone] and was put in contact with Vinita Browne, Admissions RN, and provided necessary information. Vinita Browne informed writer that the only appointment left for today is 5:30pm. [facility] will be out to assess patient and have a meeting with the family at 5:30pm. Vinita Browne, Admissions RN, can be reached in the intake office at [phone]. Shanelle/Anaid RN and Mandie/RN updated.

## 2021-08-21 NOTE — ED PROVIDER NOTES
I did have CODE STATUS discussion with family. Code changed to DNR CC.  DNR CC paperwork filled out.      Alena Bledsoe,   08/21/21 4237

## 2021-08-21 NOTE — FLOWSHEET NOTE
Writer was notified by [facility] (1:00am), \"patient in room 21 came in hypoxic and is refusing to be intubated. Multiple family members are at bedside. \" Writer arrived and was debriefed by Dr. Justine Pena and RN Sunshine Bourgeois. Oxygen levels are very low and lung activity is low as well; patient will be made comfortable during the transition. Family members (8) were present for support to one another. Patient suffers from ALS, and the mother of the patient recently passed do to the same medical issue. Writer was able to provide words of comfort and encouragement as they expressed reliving this situation all over again. The patient is receiving medication that will allow her to be comfortable during this time. At the present moment no additional services are needed. Family remains at bedside. Spiritual care will follow up.     08/21/21 0341   Encounter Summary   Services provided to: Patient and family together   Referral/Consult From: Nurse;Family   Support System Children;Family members   Continue Visiting   (8/21/21)   Complexity of Encounter Moderate   Length of Encounter 2 hours   Spiritual Assessment Completed Yes   Grief and Life Adjustment   Type End of life   Assessment Calm; Approachable;Tearful;Grieving;Coping;Peaceful;Concerns with suffering   Intervention Active listening;Sustaining presence/ Ministry of presence; Discussed illness/injury and it's impact   Outcome Expressed gratitude;Engaged in conversation

## 2021-08-21 NOTE — ED NOTES
Dr Brandee See in to further discuss end of life with pt family     Dimplekaylah Jimenez, [facility]  08/21/21 6952

## 2021-08-22 LAB
EKG ATRIAL RATE: 119 BPM
EKG P AXIS: 73 DEGREES
EKG P-R INTERVAL: 174 MS
EKG Q-T INTERVAL: 306 MS
EKG QRS DURATION: 70 MS
EKG QTC CALCULATION (BAZETT): 430 MS
EKG R AXIS: -33 DEGREES
EKG T AXIS: 37 DEGREES
EKG VENTRICULAR RATE: 119 BPM